# Patient Record
Sex: MALE | Race: WHITE | Employment: OTHER | URBAN - METROPOLITAN AREA
[De-identification: names, ages, dates, MRNs, and addresses within clinical notes are randomized per-mention and may not be internally consistent; named-entity substitution may affect disease eponyms.]

---

## 2018-03-06 ENCOUNTER — TELEPHONE (OUTPATIENT)
Dept: FAMILY MEDICINE CLINIC | Age: 68
End: 2018-03-06

## 2018-04-04 ENCOUNTER — OFFICE VISIT (OUTPATIENT)
Dept: FAMILY MEDICINE CLINIC | Age: 68
End: 2018-04-04
Payer: MEDICARE

## 2018-04-04 VITALS
HEIGHT: 73 IN | BODY MASS INDEX: 29.37 KG/M2 | DIASTOLIC BLOOD PRESSURE: 80 MMHG | HEART RATE: 96 BPM | WEIGHT: 221.6 LBS | RESPIRATION RATE: 16 BRPM | TEMPERATURE: 98 F | SYSTOLIC BLOOD PRESSURE: 182 MMHG

## 2018-04-04 DIAGNOSIS — R94.31 ABNORMAL EKG: ICD-10-CM

## 2018-04-04 DIAGNOSIS — I10 ESSENTIAL HYPERTENSION: ICD-10-CM

## 2018-04-04 DIAGNOSIS — I49.3 FREQUENT PVCS: ICD-10-CM

## 2018-04-04 DIAGNOSIS — E78.5 DYSLIPIDEMIA: ICD-10-CM

## 2018-04-04 DIAGNOSIS — E03.9 HYPOTHYROIDISM, UNSPECIFIED TYPE: Primary | ICD-10-CM

## 2018-04-04 DIAGNOSIS — I49.9 IRREGULAR HEART BEAT: ICD-10-CM

## 2018-04-04 PROCEDURE — 93000 ELECTROCARDIOGRAM COMPLETE: CPT | Performed by: NURSE PRACTITIONER

## 2018-04-04 PROCEDURE — 99204 OFFICE O/P NEW MOD 45 MIN: CPT | Performed by: NURSE PRACTITIONER

## 2018-04-04 RX ORDER — LEVOTHYROXINE SODIUM 175 UG/1
175 TABLET ORAL DAILY
Qty: 90 TABLET | Refills: 3 | Status: SHIPPED | OUTPATIENT
Start: 2018-04-04 | End: 2018-04-05 | Stop reason: ALTCHOICE

## 2018-04-04 RX ORDER — LISINOPRIL 20 MG/1
20 TABLET ORAL DAILY
COMMUNITY
End: 2018-04-04 | Stop reason: ALTCHOICE

## 2018-04-04 RX ORDER — LEVOTHYROXINE SODIUM 175 UG/1
175 TABLET ORAL DAILY
COMMUNITY
End: 2018-04-04 | Stop reason: SDUPTHER

## 2018-04-04 RX ORDER — METOPROLOL SUCCINATE 25 MG/1
25 TABLET, EXTENDED RELEASE ORAL DAILY
Qty: 30 TABLET | Refills: 3 | Status: SHIPPED | OUTPATIENT
Start: 2018-04-04 | End: 2018-07-20 | Stop reason: SDUPTHER

## 2018-04-04 RX ORDER — ATORVASTATIN CALCIUM 40 MG/1
40 TABLET, FILM COATED ORAL DAILY
Qty: 90 TABLET | Refills: 3 | Status: SHIPPED | OUTPATIENT
Start: 2018-04-04 | End: 2019-04-23 | Stop reason: SDUPTHER

## 2018-04-04 RX ORDER — ATORVASTATIN CALCIUM 40 MG/1
40 TABLET, FILM COATED ORAL DAILY
COMMUNITY
End: 2018-04-04 | Stop reason: SDUPTHER

## 2018-04-04 RX ORDER — LISINOPRIL AND HYDROCHLOROTHIAZIDE 25; 20 MG/1; MG/1
2 TABLET ORAL DAILY
Qty: 180 TABLET | Refills: 3 | Status: SHIPPED | OUTPATIENT
Start: 2018-04-04 | End: 2018-04-25 | Stop reason: ALTCHOICE

## 2018-04-04 RX ORDER — POTASSIUM CHLORIDE 20 MEQ/1
20 TABLET, EXTENDED RELEASE ORAL DAILY
Qty: 90 TABLET | Refills: 3 | Status: SHIPPED | OUTPATIENT
Start: 2018-04-04 | End: 2018-04-05 | Stop reason: SDUPTHER

## 2018-04-04 RX ORDER — POTASSIUM CHLORIDE 1.5 G/1.77G
20 POWDER, FOR SOLUTION ORAL DAILY
COMMUNITY
End: 2018-04-04 | Stop reason: ALTCHOICE

## 2018-04-04 RX ORDER — LISINOPRIL AND HYDROCHLOROTHIAZIDE 20; 12.5 MG/1; MG/1
1 TABLET ORAL DAILY
COMMUNITY
End: 2018-04-19 | Stop reason: ALTCHOICE

## 2018-04-04 ASSESSMENT — PATIENT HEALTH QUESTIONNAIRE - PHQ9
2. FEELING DOWN, DEPRESSED OR HOPELESS: 0
SUM OF ALL RESPONSES TO PHQ9 QUESTIONS 1 & 2: 0
SUM OF ALL RESPONSES TO PHQ QUESTIONS 1-9: 0
1. LITTLE INTEREST OR PLEASURE IN DOING THINGS: 0

## 2018-04-05 ENCOUNTER — TELEPHONE (OUTPATIENT)
Dept: FAMILY MEDICINE CLINIC | Age: 68
End: 2018-04-05

## 2018-04-05 DIAGNOSIS — I10 ESSENTIAL HYPERTENSION: Primary | ICD-10-CM

## 2018-04-05 DIAGNOSIS — E03.9 ACQUIRED HYPOTHYROIDISM: ICD-10-CM

## 2018-04-05 PROBLEM — E78.5 DYSLIPIDEMIA: Status: ACTIVE | Noted: 2018-04-05

## 2018-04-05 LAB
A/G RATIO: 1.4 (ref 1.5–2.5)
ABSOLUTE BASO #: 0 /CMM (ref 0–200)
ABSOLUTE EOS #: 100 /CMM (ref 0–500)
ABSOLUTE LYMPH #: 1400 /CMM (ref 1000–4800)
ABSOLUTE MONO #: 600 /CMM (ref 0–800)
ABSOLUTE NEUT #: 3400 /CMM (ref 1800–7700)
ALBUMIN SERPL-MCNC: 4.3 GM/DL (ref 3.5–5)
ALP BLD-CCNC: 81 IU/L (ref 41–137)
ALT SERPL-CCNC: 25 IU/L (ref 10–40)
ANION GAP SERPL CALCULATED.3IONS-SCNC: 7 MMOL/L (ref 4–12)
AST SERPL-CCNC: 24 IU/L (ref 15–41)
BASOPHILS RELATIVE PERCENT: 0.7 % (ref 0–2)
BILIRUB SERPL-MCNC: 1.1 MG/DL (ref 0.2–1)
BUN BLDV-MCNC: 18 MG/DL (ref 7–20)
CALCIUM SERPL-MCNC: 9.5 MG/DL (ref 8.8–10.5)
CHLORIDE BLD-SCNC: 101 MEQ/L (ref 101–111)
CHOLESTEROL/HDL RELATIVE RISK: 2.9 (ref 4–5)
CHOLESTEROL: 138 MG/DL
CO2: 33 MEQ/L (ref 21–32)
CREAT SERPL-MCNC: 1.08 MG/DL (ref 0.7–1.3)
CREATININE CLEARANCE: >60
DIRECT-LDL / HDL RISK: 2
EOSINOPHILS RELATIVE PERCENT: 2.5 % (ref 0–6)
GLUCOSE: 102 MG/DL (ref 70–110)
HCT VFR BLD CALC: 45 % (ref 40–49)
HDLC SERPL-MCNC: 47 MG/DL
HEMOGLOBIN: 15.3 GM/DL (ref 13.5–16.5)
LDL CHOLESTEROL DIRECT: 97 MG/DL
LYMPHOCYTES RELATIVE PERCENT: 25.7 % (ref 15–45)
MCH RBC QN AUTO: 30.5 PG (ref 27.5–33)
MCHC RBC AUTO-ENTMCNC: 34 GM/DL (ref 33–36)
MCV RBC AUTO: 89.7 CU MIC (ref 80–97)
MONOCYTES RELATIVE PERCENT: 10.2 % (ref 2–10)
NEUTROPHILS RELATIVE PERCENT: 60.9 % (ref 40–70)
NUCLEATED RBCS: 0 /100 WBC
PDW BLD-RTO: 13.3 % (ref 12–16)
PLATELET # BLD: 202 TH/CMM (ref 150–400)
POTASSIUM SERPL-SCNC: 3.3 MEQ/L (ref 3.6–5)
RBC # BLD: 5.02 MIL/CMM (ref 4.5–6)
SODIUM BLD-SCNC: 141 MEQ/L (ref 135–145)
T4 FREE: 1.15 NG/DL (ref 0.61–1.12)
TOTAL PROTEIN: 7.3 G/DL (ref 6.2–8)
TRIGL SERPL-MCNC: 91 MG/DL
TSH REFLEX: < 0.03 MCIU/ML (ref 0.49–4.67)
VLDLC SERPL CALC-MCNC: 18 MG/DL
WBC # BLD: 5.6 TH/CMM (ref 4.4–10.5)

## 2018-04-05 RX ORDER — LEVOTHYROXINE SODIUM 0.15 MG/1
150 TABLET ORAL DAILY
Qty: 30 TABLET | Refills: 3 | Status: SHIPPED | OUTPATIENT
Start: 2018-04-05 | End: 2018-06-12 | Stop reason: DRUGHIGH

## 2018-04-05 RX ORDER — POTASSIUM CHLORIDE 20 MEQ/1
20 TABLET, EXTENDED RELEASE ORAL 2 TIMES DAILY
Qty: 180 TABLET | Refills: 3 | Status: SHIPPED | OUTPATIENT
Start: 2018-04-05 | End: 2019-08-05 | Stop reason: SDUPTHER

## 2018-04-11 ENCOUNTER — OFFICE VISIT (OUTPATIENT)
Dept: FAMILY MEDICINE CLINIC | Age: 68
End: 2018-04-11
Payer: MEDICARE

## 2018-04-11 VITALS
WEIGHT: 220.8 LBS | TEMPERATURE: 97.9 F | HEIGHT: 73 IN | SYSTOLIC BLOOD PRESSURE: 184 MMHG | RESPIRATION RATE: 14 BRPM | HEART RATE: 60 BPM | DIASTOLIC BLOOD PRESSURE: 102 MMHG | BODY MASS INDEX: 29.26 KG/M2

## 2018-04-11 DIAGNOSIS — I10 ESSENTIAL HYPERTENSION: Primary | ICD-10-CM

## 2018-04-11 DIAGNOSIS — Z23 NEED FOR PNEUMOCOCCAL VACCINATION: ICD-10-CM

## 2018-04-11 PROCEDURE — G0009 ADMIN PNEUMOCOCCAL VACCINE: HCPCS | Performed by: NURSE PRACTITIONER

## 2018-04-11 PROCEDURE — 90670 PCV13 VACCINE IM: CPT | Performed by: NURSE PRACTITIONER

## 2018-04-11 PROCEDURE — 99213 OFFICE O/P EST LOW 20 MIN: CPT | Performed by: NURSE PRACTITIONER

## 2018-04-11 RX ORDER — NIFEDIPINE 30 MG/1
30 TABLET, EXTENDED RELEASE ORAL DAILY
Qty: 30 TABLET | Refills: 3 | Status: SHIPPED | OUTPATIENT
Start: 2018-04-11 | End: 2018-08-15 | Stop reason: SDUPTHER

## 2018-04-19 ENCOUNTER — TELEPHONE (OUTPATIENT)
Dept: FAMILY MEDICINE CLINIC | Age: 68
End: 2018-04-19

## 2018-04-19 DIAGNOSIS — E87.6 HYPOKALEMIA: ICD-10-CM

## 2018-04-19 DIAGNOSIS — I10 ESSENTIAL HYPERTENSION: Primary | ICD-10-CM

## 2018-04-19 LAB
ANION GAP SERPL CALCULATED.3IONS-SCNC: 7 MMOL/L (ref 4–12)
BUN BLDV-MCNC: 25 MG/DL (ref 7–20)
CALCIUM SERPL-MCNC: 9.5 MG/DL (ref 8.8–10.5)
CHLORIDE BLD-SCNC: 101 MEQ/L (ref 101–111)
CO2: 33 MEQ/L (ref 21–32)
CREAT SERPL-MCNC: 1.21 MG/DL (ref 0.7–1.3)
CREATININE CLEARANCE: 60
GLUCOSE: 111 MG/DL (ref 70–110)
POTASSIUM SERPL-SCNC: 3.3 MEQ/L (ref 3.6–5)
SODIUM BLD-SCNC: 141 MEQ/L (ref 135–145)

## 2018-04-19 RX ORDER — SPIRONOLACTONE 25 MG/1
25 TABLET ORAL DAILY
Qty: 30 TABLET | Refills: 3 | Status: SHIPPED | OUTPATIENT
Start: 2018-04-19 | End: 2018-04-25 | Stop reason: ALTCHOICE

## 2018-04-25 ENCOUNTER — OFFICE VISIT (OUTPATIENT)
Dept: FAMILY MEDICINE CLINIC | Age: 68
End: 2018-04-25
Payer: MEDICARE

## 2018-04-25 VITALS
RESPIRATION RATE: 12 BRPM | HEIGHT: 73 IN | DIASTOLIC BLOOD PRESSURE: 82 MMHG | BODY MASS INDEX: 29.74 KG/M2 | HEART RATE: 68 BPM | TEMPERATURE: 97.6 F | SYSTOLIC BLOOD PRESSURE: 138 MMHG | WEIGHT: 224.4 LBS

## 2018-04-25 DIAGNOSIS — I10 ESSENTIAL HYPERTENSION: Primary | ICD-10-CM

## 2018-04-25 PROCEDURE — 99213 OFFICE O/P EST LOW 20 MIN: CPT | Performed by: NURSE PRACTITIONER

## 2018-04-25 RX ORDER — SPIRONOLACTONE 25 MG/1
25 TABLET ORAL DAILY
COMMUNITY
End: 2018-08-29

## 2018-04-25 RX ORDER — HYDROCHLOROTHIAZIDE 12.5 MG/1
12.5 TABLET ORAL DAILY
Qty: 30 TABLET | Refills: 3 | Status: SHIPPED | OUTPATIENT
Start: 2018-04-25 | End: 2018-08-28 | Stop reason: SDUPTHER

## 2018-04-25 RX ORDER — LISINOPRIL 40 MG/1
40 TABLET ORAL DAILY
Qty: 30 TABLET | Refills: 3 | Status: SHIPPED | OUTPATIENT
Start: 2018-04-25 | End: 2018-08-15 | Stop reason: SDUPTHER

## 2018-05-10 ENCOUNTER — TELEPHONE (OUTPATIENT)
Dept: FAMILY MEDICINE CLINIC | Age: 68
End: 2018-05-10

## 2018-05-10 LAB
ANION GAP SERPL CALCULATED.3IONS-SCNC: 6 MMOL/L (ref 4–12)
BUN BLDV-MCNC: 23 MG/DL (ref 7–20)
CALCIUM SERPL-MCNC: 9.3 MG/DL (ref 8.8–10.5)
CHLORIDE BLD-SCNC: 103 MEQ/L (ref 101–111)
CO2: 32 MEQ/L (ref 21–32)
CREAT SERPL-MCNC: 1.16 MG/DL (ref 0.7–1.3)
CREATININE CLEARANCE: >60
GLUCOSE: 156 MG/DL (ref 70–110)
POTASSIUM SERPL-SCNC: 3.4 MEQ/L (ref 3.6–5)
SODIUM BLD-SCNC: 141 MEQ/L (ref 135–145)

## 2018-05-24 ENCOUNTER — OFFICE VISIT (OUTPATIENT)
Dept: FAMILY MEDICINE CLINIC | Age: 68
End: 2018-05-24
Payer: MEDICARE

## 2018-05-24 VITALS
HEART RATE: 53 BPM | SYSTOLIC BLOOD PRESSURE: 130 MMHG | DIASTOLIC BLOOD PRESSURE: 82 MMHG | HEIGHT: 72 IN | BODY MASS INDEX: 30.2 KG/M2 | RESPIRATION RATE: 14 BRPM | WEIGHT: 223 LBS | TEMPERATURE: 98 F

## 2018-05-24 DIAGNOSIS — E03.9 ACQUIRED HYPOTHYROIDISM: ICD-10-CM

## 2018-05-24 DIAGNOSIS — I10 ESSENTIAL HYPERTENSION: Primary | ICD-10-CM

## 2018-05-24 PROCEDURE — 99213 OFFICE O/P EST LOW 20 MIN: CPT | Performed by: NURSE PRACTITIONER

## 2018-06-12 ENCOUNTER — TELEPHONE (OUTPATIENT)
Dept: FAMILY MEDICINE CLINIC | Age: 68
End: 2018-06-12

## 2018-06-12 DIAGNOSIS — E03.9 ACQUIRED HYPOTHYROIDISM: Primary | ICD-10-CM

## 2018-06-12 LAB
POTASSIUM SERPL-SCNC: 3.7 MEQ/L (ref 3.6–5)
T4 FREE: 1.15 NG/DL (ref 0.61–1.12)
TSH REFLEX: < 0.03 MCIU/ML (ref 0.49–4.67)

## 2018-06-12 RX ORDER — LEVOTHYROXINE SODIUM 137 UG/1
137 TABLET ORAL DAILY
Qty: 30 TABLET | Refills: 3 | Status: SHIPPED | OUTPATIENT
Start: 2018-06-12 | End: 2018-07-31 | Stop reason: SDUPTHER

## 2018-07-13 ENCOUNTER — OFFICE VISIT (OUTPATIENT)
Dept: FAMILY MEDICINE CLINIC | Age: 68
End: 2018-07-13
Payer: MEDICARE

## 2018-07-13 VITALS
DIASTOLIC BLOOD PRESSURE: 78 MMHG | HEART RATE: 57 BPM | SYSTOLIC BLOOD PRESSURE: 138 MMHG | WEIGHT: 225 LBS | HEIGHT: 73 IN | TEMPERATURE: 97.6 F | RESPIRATION RATE: 14 BRPM | BODY MASS INDEX: 29.82 KG/M2

## 2018-07-13 DIAGNOSIS — M54.16 LUMBAR RADICULOPATHY: Primary | ICD-10-CM

## 2018-07-13 PROCEDURE — 99213 OFFICE O/P EST LOW 20 MIN: CPT | Performed by: NURSE PRACTITIONER

## 2018-07-13 PROCEDURE — 96372 THER/PROPH/DIAG INJ SC/IM: CPT | Performed by: NURSE PRACTITIONER

## 2018-07-13 RX ORDER — TIZANIDINE 4 MG/1
4 TABLET ORAL EVERY 6 HOURS PRN
Qty: 30 TABLET | Refills: 0 | Status: SHIPPED | OUTPATIENT
Start: 2018-07-13 | End: 2018-07-13 | Stop reason: SDUPTHER

## 2018-07-13 RX ORDER — PREDNISONE 20 MG/1
40 TABLET ORAL DAILY
Qty: 6 TABLET | Refills: 0 | Status: SHIPPED | OUTPATIENT
Start: 2018-07-13 | End: 2018-07-13 | Stop reason: SDUPTHER

## 2018-07-13 RX ORDER — TIZANIDINE 4 MG/1
4 TABLET ORAL EVERY 6 HOURS PRN
Qty: 30 TABLET | Refills: 0 | Status: SHIPPED | OUTPATIENT
Start: 2018-07-13 | End: 2018-08-29 | Stop reason: ALTCHOICE

## 2018-07-13 RX ORDER — KETOROLAC TROMETHAMINE 30 MG/ML
30 INJECTION, SOLUTION INTRAMUSCULAR; INTRAVENOUS ONCE
Status: COMPLETED | OUTPATIENT
Start: 2018-07-13 | End: 2018-07-13

## 2018-07-13 RX ORDER — PREDNISONE 20 MG/1
40 TABLET ORAL DAILY
Qty: 6 TABLET | Refills: 0 | Status: SHIPPED | OUTPATIENT
Start: 2018-07-13 | End: 2018-07-16

## 2018-07-13 RX ORDER — METHYLPREDNISOLONE ACETATE 40 MG/ML
40 INJECTION, SUSPENSION INTRA-ARTICULAR; INTRALESIONAL; INTRAMUSCULAR; SOFT TISSUE ONCE
Status: COMPLETED | OUTPATIENT
Start: 2018-07-13 | End: 2018-07-13

## 2018-07-13 RX ADMIN — METHYLPREDNISOLONE ACETATE 40 MG: 40 INJECTION, SUSPENSION INTRA-ARTICULAR; INTRALESIONAL; INTRAMUSCULAR; SOFT TISSUE at 11:16

## 2018-07-13 RX ADMIN — KETOROLAC TROMETHAMINE 30 MG: 30 INJECTION, SOLUTION INTRAMUSCULAR; INTRAVENOUS at 11:17

## 2018-07-13 NOTE — PATIENT INSTRUCTIONS
Patient Education        Herniated Disc: Exercises  Your Care Instructions  Here are some examples of typical rehabilitation exercises for your condition. Start each exercise slowly. Ease off the exercise if you start to have pain. Your doctor or physical therapist will tell you when you can start these exercises and which ones will work best for you. How to stay safe  These exercises can help you move easier and feel better. But when you first start doing them, you may have more pain in your back. This is normal. But it is important to pay close attention to your pain during and after each exercise. · Keep doing these exercises if your pain stays the same or moves from your leg and buttock more toward the middle of your spine. Pain moving out of your leg and buttock is a good sign. · Stop doing these exercises if your pain gets worse in your leg and buttock. Stop if you start to have pain in your leg and buttock that you didn't have before. Be sure to do these exercises in the order they appear. Note how your pain changes before you move to the next one. If your pain is much worse right after exercise and stays worse the next day, do not do any of these exercises. How to do the exercises  1. Rest on belly    1. Lie on your stomach, face down, with your head turned to the side. Place your arms beside your body. If this bothers your neck, place your hands, one on top of the other, underneath your forehead. This will help support your head and neck. 2. Try to relax your lower back muscles as much as you can. 3. Continue to lie on your stomach for 2 minutes. 4. If your pain spreads down your leg or increases down your leg, stop this exercise and do not do the next exercises. 2. Press-up    1. Lie on your stomach, face down. Keep your elbows tucked into your sides and under your shoulders. 2. Press your elbows down into the floor to raise your upper back. As you do this, relax your stomach muscles.  Allow please click on the \"Sign Up Now\" link. Current as of: November 29, 2017  Content Version: 11.6  © 8120-7676 [employer]. Care instructions adapted under license by [facility] ([facility]). If you have questions about a medical condition or this instruction, always ask your healthcare professional. [address] any warranty or liability for your use of this information.

## 2018-07-13 NOTE — PROGRESS NOTES
Take 2 tablets by mouth daily for 3 days  -     tiZANidine (ZANAFLEX) 4 MG tablet; Take 1 tablet by mouth every 6 hours as needed (back pain, muscle spasms)    Other orders  -     Discontinue: predniSONE (DELTASONE) 20 MG tablet; Take 2 tablets by mouth daily for 3 days  -     Discontinue: tiZANidine (ZANAFLEX) 4 MG tablet; Take 1 tablet by mouth every 6 hours as needed (back pain, muscle spasms)      - Benefits, risks and SE discussed  - home exercises  - if no improvement, call or RTC  Return if symptoms worsen or fail to improve. Donavan Soria received counseling on the following healthy behaviors: medication adherence  Reviewed prior labs and health maintenance. Continue current medications, diet and exercise. Discussed use, benefit, and side effects of prescribed medications. Barriers to medication compliance addressed. Patient given educational materials - see patient instructions. All patient questions answered. Patient voiced understanding.

## 2018-07-26 ENCOUNTER — TELEPHONE (OUTPATIENT)
Dept: FAMILY MEDICINE CLINIC | Age: 68
End: 2018-07-26
Payer: MEDICARE

## 2018-07-26 DIAGNOSIS — Z53.20 COLONOSCOPY REFUSED: Primary | ICD-10-CM

## 2018-07-26 LAB
CONTROL: NORMAL
HEMOCCULT STL QL: NEGATIVE

## 2018-07-26 PROCEDURE — 82274 ASSAY TEST FOR BLOOD FECAL: CPT | Performed by: NURSE PRACTITIONER

## 2018-07-26 NOTE — TELEPHONE ENCOUNTER
Patient returned FIT test to office. Test performed and results were NEGATIVE . Results recorded to pt's chart and HM updated. Encounter routed to [facility] for further review.

## 2018-07-27 ENCOUNTER — TELEPHONE (OUTPATIENT)
Dept: FAMILY MEDICINE CLINIC | Age: 68
End: 2018-07-27

## 2018-07-27 NOTE — TELEPHONE ENCOUNTER
----- Message from KATHRINE Singer CNP sent at 7/27/2018  7:24 AM EDT -----  Let Dillard Osgood know his FIT test was negative.

## 2018-07-31 ENCOUNTER — TELEPHONE (OUTPATIENT)
Dept: FAMILY MEDICINE CLINIC | Age: 68
End: 2018-07-31

## 2018-07-31 DIAGNOSIS — E03.9 ACQUIRED HYPOTHYROIDISM: ICD-10-CM

## 2018-07-31 LAB
T4 FREE: 1.15 NG/DL (ref 0.61–1.12)
TSH REFLEX: 0.17 MCIU/ML (ref 0.49–4.67)

## 2018-07-31 RX ORDER — LEVOTHYROXINE SODIUM 112 UG/1
112 TABLET ORAL DAILY
Qty: 30 TABLET | Refills: 5 | Status: SHIPPED | OUTPATIENT
Start: 2018-07-31 | End: 2018-08-01 | Stop reason: SDUPTHER

## 2018-08-01 RX ORDER — LEVOTHYROXINE SODIUM 112 UG/1
112 TABLET ORAL DAILY
Qty: 30 TABLET | Refills: 5 | Status: SHIPPED | OUTPATIENT
Start: 2018-08-01 | End: 2019-02-05 | Stop reason: SDUPTHER

## 2018-08-28 DIAGNOSIS — I10 ESSENTIAL HYPERTENSION: ICD-10-CM

## 2018-08-28 RX ORDER — HYDROCHLOROTHIAZIDE 12.5 MG/1
TABLET ORAL
Qty: 90 TABLET | Refills: 3 | Status: SHIPPED | OUTPATIENT
Start: 2018-08-28 | End: 2018-09-25 | Stop reason: ALTCHOICE

## 2018-08-29 ENCOUNTER — OFFICE VISIT (OUTPATIENT)
Dept: FAMILY MEDICINE CLINIC | Age: 68
End: 2018-08-29
Payer: MEDICARE

## 2018-08-29 VITALS
TEMPERATURE: 98.3 F | SYSTOLIC BLOOD PRESSURE: 128 MMHG | HEIGHT: 72 IN | WEIGHT: 221 LBS | RESPIRATION RATE: 14 BRPM | BODY MASS INDEX: 29.93 KG/M2 | DIASTOLIC BLOOD PRESSURE: 78 MMHG | HEART RATE: 74 BPM

## 2018-08-29 DIAGNOSIS — I10 ESSENTIAL HYPERTENSION: Primary | ICD-10-CM

## 2018-08-29 DIAGNOSIS — E03.9 ACQUIRED HYPOTHYROIDISM: ICD-10-CM

## 2018-08-29 PROCEDURE — 99214 OFFICE O/P EST MOD 30 MIN: CPT | Performed by: NURSE PRACTITIONER

## 2018-08-29 RX ORDER — ASPIRIN 81 MG/1
81 TABLET ORAL DAILY
Qty: 90 TABLET | Refills: 3 | Status: SHIPPED | OUTPATIENT
Start: 2018-08-29 | End: 2019-02-20 | Stop reason: ALTCHOICE

## 2018-08-29 NOTE — PROGRESS NOTES
Chief Complaint   Patient presents with    Follow-up     3 month f/u hypothyroidism, no new concerns        History obtained from chart review and the patient. SUBJECTIVE:  Josefina Jane is a 70 y.o. male that presents today for follow up HTN and Hypothyroidism    Hypothyroidism    HPI:  Currently treated for Hypothyroidism? Yes - dose was decreased to 112 mcg  Fatigue? No  Recent change in weight? No  Cold/Heat intolerance? No  Diarrhea/Constipation? No  Diaphoresis? No  Anxiety? No  Palpitations? No   Hair Loss? Yes    HTN    Does patient check BP regularly at home? -130/80  Current Medication regimen - Lisinopril 40 mg, HCTZ 12.5 mg Procardia 30 mg Toprol 25 mg  Tolerating medications well? - yes    Shortness of breath or chest pain? No  Headache or visual complaints? No  Neurologic changes like confusion? No  Extremity edema?  No    BP Readings from Last 3 Encounters:   08/29/18 128/78   07/13/18 138/78   05/24/18 130/82       Age/Gender Health Maintenance    Lipid - 4/18  DM Screen - 4/18  Colon Cancer Screening - 50, FIT test given  Lung Cancer Screening (Age 54 to [de-identified] with 30 pack year hx, current smoker or quit within past 15 years) - n/a    Tetanus - needs  Influenza Vaccine - 9/18  Pneumonia Vaccine - Prevnar 4/11/18  Zostavax - needs     PSA testing discussion - 49  AAA Screening - needs    Falls screening - n/a    Current Outpatient Prescriptions   Medication Sig Dispense Refill    aspirin EC 81 MG EC tablet Take 1 tablet by mouth daily 90 tablet 3    hydrochlorothiazide (HYDRODIURIL) 12.5 MG tablet TAKE 1 TABLET BY MOUTH ONCE DAILY 90 tablet 3    lisinopril (PRINIVIL;ZESTRIL) 40 MG tablet TAKE 1 TABLET BY MOUTH ONCE DAILY 90 tablet 3    spironolactone (ALDACTONE) 25 MG tablet TAKE 1 TABLET BY MOUTH ONCE DAILY 90 tablet 3    NIFEdipine (PROCARDIA XL) 30 MG extended release tablet TAKE 1 TABLET BY MOUTH ONCE DAILY 90 tablet 3    levothyroxine (SYNTHROID) 112 MCG tablet Take 1 tablet by mouth Daily 30 tablet 5    metoprolol succinate (TOPROL XL) 25 MG extended release tablet TAKE 1 TABLET BY MOUTH ONCE DAILY 90 tablet 3    potassium chloride (KLOR-CON M) 20 MEQ extended release tablet Take 1 tablet by mouth 2 times daily (Patient taking differently: Take 20 mEq by mouth daily ) 180 tablet 3    atorvastatin (LIPITOR) 40 MG tablet Take 1 tablet by mouth daily 90 tablet 3     No current facility-administered medications for this visit. Orders Placed This Encounter   Medications    aspirin EC 81 MG EC tablet     Sig: Take 1 tablet by mouth daily     Dispense:  90 tablet     Refill:  3         All medications reviewed and reconciled, including OTC and herbal medications. Updated list given to patient. Patient Active Problem List   Diagnosis    Essential hypertension    Dyslipidemia    Acquired hypothyroidism       Past Medical History:   Diagnosis Date    Dyslipidemia     Essential hypertension     Head trauma     Hypothyroidism     Stroke ([facility] [address] 1981       Past Surgical History:   Procedure Laterality Date    ANKLE SURGERY Right 07/2014       Allergies   Allergen Reactions    Penicillins Swelling    Sulfa Antibiotics Swelling       Social History     Social History    Marital status:      Spouse name: N/A    Number of children: N/A    Years of education: N/A     Occupational History    Not on file. Social History Main Topics    Smoking status: Never Smoker    Smokeless tobacco: Never Used    Alcohol use No    Drug use: No    Sexual activity: Not on file     Other Topics Concern    Not on file     Social History Narrative    No narrative on file       Family History   Problem Relation Age of Onset    Cancer Mother     Other Father 76        parkinsons         I have reviewed the patient's past medical history, past surgical history, allergies, medications, social and family history and I have made updates where appropriate.       Review of EXAM:  Vitals:    08/29/18 1429   BP: 128/78   Pulse: 74   Resp: 14   Temp: 98.3 °F (36.8 °C)   TempSrc: Oral   Weight: 221 lb (100.2 kg)   Height: 6' (1.829 m)     Body mass index is 29.97 kg/m². VS Reviewed  General Appearance: A&O x 3, No acute distress,well developed and well- nourished  Head: normocephalic and atraumatic  Eyes: pupils equal, round, and reactive to light, extraocular eye movements intact, conjunctivae and eye lids without erythema  Neck: supple and non-tender without mass, no thyromegaly or thyroid nodules, no cervical lymphadenopathy  Pulmonary/Chest: clear to auscultation bilaterally- no wheezes, rales or rhonchi, normal air movement, no respiratory distress or retractions  Cardiovascular: S1 and S2 auscultated with a few ectopic beats. No murmurs, rubs, clicks, or gallops, distal pulses intact. Abdomen: soft, non-tender, non-distended, bowl sounds physiologic,  no rebound or guarding, no masses or hernias noted. Liver and spleen without enlargement. Extremities: no cyanosis, clubbing or edema of the lower extremities. +2 PT/DP bilaterally. Musculoskeletal: No joint swelling or gross deformity   Neuro:  Alert, 5/5 strength globally and symmetrically  Psych: Affect appropriate. Mood normal. Thought process is normal without evidence of depression or psychosis. Good insight and appropriate interaction. Cognition and memory appear to be intact. Skin: warm and dry, no rash or erythema  Lymph:  No cervical, auricular or supraclavicular lymph nodes palpated        ASSESSMENT & PLAN  Mavis Cowden was seen today for follow-up. Diagnoses and all orders for this visit:    Essential hypertension  -     aspirin EC 81 MG EC tablet;  Take 1 tablet by mouth daily    Acquired hypothyroidism    - con't Metoprolol, Procardia, hctz, Spironolactone, Lisinopril  - dose of Synthroid recently adjusted and decreased to 112 mcg, labs placed to recheck TSH in 4 weeks  - add Baby ASA for cardiac protection (Hx of hemorrhagic stroke in 1981 due to trauma, but no other Hx of bleeding issues)      DISPOSITION    Return in about 6 months (around 2/28/2019) for chronic conditions. Nate Woods released without restrictions. PATIENT COUNSELING    Counseling was provided today regarding the following topics: Healthy eating habits, Regular exercise, substance abuse and healthy sleep habits. Nate Woods received counseling on the following healthy behaviors: medication adherence    Patient given educational materials on: See Attached    I have instructed Nate Woods to complete a self tracking handout on Blood Pressures  and instructed them to bring it with them to his next appointment. Barriers to learning and self management: none    Discussed use, benefit, and side effects of prescribed medications. Barriers to medication compliance addressed. All patient questions answered. Pt voiced understanding.        Electronically signed by KATHRINE Menard CNP on 8/29/2018 at 2:39 PM

## 2018-08-29 NOTE — PROGRESS NOTES
Visit Information    Have you changed or started any medications since your last visit including any over-the-counter medicines, vitamins, or herbal medicines? no   Are you having any side effects from any of your medications? -  no  Have you stopped taking any of your medications? Is so, why? -  no    Have you seen any other physician or provider since your last visit? No  Have you had any other diagnostic tests since your last visit? No  Have you been seen in the emergency room and/or had an admission to a hospital since we last saw you? No  Have you had your routine dental cleaning in the past 6 months? no    Have you activated your [employer] account? If not, what are your barriers?  No: pt declined      Patient Care Team:  KATHRINE Thayer CNP as PCP - General (Family Medicine)    Medical History Review  Past Medical, Family, and Social History reviewed and does contribute to the patient presenting condition    Health Maintenance   Topic Date Due    Hepatitis C screen  1950    DTaP/Tdap/Td vaccine (1 - Tdap) 08/28/1968    Shingles Vaccine (1 of 2 - 2 Dose Series) 08/28/1999    Flu vaccine (1) 09/01/2018    Pneumococcal low/med risk (2 of 2 - PPSV23) 04/11/2019    Creatinine monitoring  05/10/2019    Potassium monitoring  06/12/2019    Colon Cancer Screen FIT/FOBT  07/26/2019    TSH testing  07/31/2019    Diabetes screen  04/05/2021    Lipid screen  04/05/2023

## 2018-09-25 ENCOUNTER — TELEPHONE (OUTPATIENT)
Dept: FAMILY MEDICINE CLINIC | Age: 68
End: 2018-09-25

## 2018-09-25 DIAGNOSIS — I10 ESSENTIAL HYPERTENSION: Primary | ICD-10-CM

## 2018-09-25 RX ORDER — CHLORTHALIDONE 25 MG/1
25 TABLET ORAL DAILY
Qty: 90 TABLET | Refills: 3 | Status: SHIPPED | OUTPATIENT
Start: 2018-09-25 | End: 2018-09-25 | Stop reason: SDUPTHER

## 2018-09-25 RX ORDER — CHLORTHALIDONE 25 MG/1
25 TABLET ORAL DAILY
Qty: 14 TABLET | Refills: 0 | Status: SHIPPED | OUTPATIENT
Start: 2018-09-25 | End: 2019-02-20 | Stop reason: ALTCHOICE

## 2018-10-01 LAB — TSH REFLEX: 0.73 MCIU/ML (ref 0.49–4.67)

## 2018-10-02 ENCOUNTER — TELEPHONE (OUTPATIENT)
Dept: FAMILY MEDICINE CLINIC | Age: 68
End: 2018-10-02

## 2018-10-02 DIAGNOSIS — E03.9 ACQUIRED HYPOTHYROIDISM: Primary | ICD-10-CM

## 2019-01-10 ENCOUNTER — TELEPHONE (OUTPATIENT)
Dept: FAMILY MEDICINE CLINIC | Age: 69
End: 2019-01-10

## 2019-01-23 ENCOUNTER — TELEPHONE (OUTPATIENT)
Dept: FAMILY MEDICINE CLINIC | Age: 69
End: 2019-01-23

## 2019-01-23 LAB — TSH REFLEX: 3.13 MCIU/ML (ref 0.49–4.67)

## 2019-02-05 DIAGNOSIS — E03.9 ACQUIRED HYPOTHYROIDISM: ICD-10-CM

## 2019-02-05 RX ORDER — LEVOTHYROXINE SODIUM 112 UG/1
112 TABLET ORAL DAILY
Qty: 90 TABLET | Refills: 3 | Status: SHIPPED | OUTPATIENT
Start: 2019-02-05 | End: 2019-09-09

## 2019-02-20 ENCOUNTER — OFFICE VISIT (OUTPATIENT)
Dept: FAMILY MEDICINE CLINIC | Age: 69
End: 2019-02-20
Payer: MEDICARE

## 2019-02-20 ENCOUNTER — TELEPHONE (OUTPATIENT)
Dept: FAMILY MEDICINE CLINIC | Age: 69
End: 2019-02-20

## 2019-02-20 VITALS
HEIGHT: 72 IN | TEMPERATURE: 98.1 F | RESPIRATION RATE: 14 BRPM | HEART RATE: 56 BPM | DIASTOLIC BLOOD PRESSURE: 82 MMHG | BODY MASS INDEX: 31.56 KG/M2 | SYSTOLIC BLOOD PRESSURE: 132 MMHG | WEIGHT: 233 LBS

## 2019-02-20 DIAGNOSIS — E78.5 DYSLIPIDEMIA: ICD-10-CM

## 2019-02-20 DIAGNOSIS — E03.9 ACQUIRED HYPOTHYROIDISM: ICD-10-CM

## 2019-02-20 DIAGNOSIS — I10 ESSENTIAL HYPERTENSION: Primary | ICD-10-CM

## 2019-02-20 DIAGNOSIS — R73.01 IMPAIRED FASTING BLOOD SUGAR: ICD-10-CM

## 2019-02-20 PROCEDURE — 99214 OFFICE O/P EST MOD 30 MIN: CPT | Performed by: NURSE PRACTITIONER

## 2019-02-20 RX ORDER — HYDROCHLOROTHIAZIDE 12.5 MG/1
12.5 CAPSULE, GELATIN COATED ORAL DAILY
COMMUNITY
End: 2019-08-29 | Stop reason: SDUPTHER

## 2019-02-20 ASSESSMENT — PATIENT HEALTH QUESTIONNAIRE - PHQ9
SUM OF ALL RESPONSES TO PHQ9 QUESTIONS 1 & 2: 0
2. FEELING DOWN, DEPRESSED OR HOPELESS: 0
SUM OF ALL RESPONSES TO PHQ QUESTIONS 1-9: 0
1. LITTLE INTEREST OR PLEASURE IN DOING THINGS: 0
SUM OF ALL RESPONSES TO PHQ QUESTIONS 1-9: 0

## 2019-03-12 ENCOUNTER — TELEPHONE (OUTPATIENT)
Dept: FAMILY MEDICINE CLINIC | Age: 69
End: 2019-03-12

## 2019-03-12 PROBLEM — R73.03 PREDIABETES: Status: ACTIVE | Noted: 2019-03-12

## 2019-03-12 LAB
A/G RATIO: 1.2 (ref 1.5–2.5)
ABSOLUTE BASO #: 0 /CMM (ref 0–200)
ABSOLUTE EOS #: 200 /CMM (ref 0–500)
ABSOLUTE LYMPH #: 1400 /CMM (ref 1000–4800)
ABSOLUTE MONO #: 500 /CMM (ref 0–800)
ABSOLUTE NEUT #: 3800 /CMM (ref 1800–7700)
ALBUMIN SERPL-MCNC: 4.2 GM/DL (ref 3.5–5)
ALP BLD-CCNC: 63 IU/L (ref 41–137)
ALT SERPL-CCNC: 22 IU/L (ref 10–40)
ANION GAP SERPL CALCULATED.3IONS-SCNC: 11 MMOL/L (ref 4–12)
AST SERPL-CCNC: 25 IU/L (ref 15–41)
BASOPHILS RELATIVE PERCENT: 0.8 % (ref 0–2)
BILIRUB SERPL-MCNC: 1.1 MG/DL (ref 0.2–1)
BUN BLDV-MCNC: 19 MG/DL (ref 7–20)
CALCIUM SERPL-MCNC: 9.3 MG/DL (ref 8.8–10.5)
CHLORIDE BLD-SCNC: 100 MEQ/L (ref 101–111)
CHOLESTEROL/HDL RELATIVE RISK: 3.8 (ref 4–5)
CHOLESTEROL: 153 MG/DL
CO2: 29 MEQ/L (ref 21–32)
CREAT SERPL-MCNC: 1.14 MG/DL (ref 0.7–1.3)
CREATININE CLEARANCE: >60
DIRECT-LDL / HDL RISK: 2.4
EOSINOPHILS RELATIVE PERCENT: 3.2 % (ref 0–6)
ESTIMATED AVERAGE GLUCOSE: 128 MG/DL
GLUCOSE: 107 MG/DL (ref 70–110)
HBA1C MFR BLD: 6.1 % (ref 4.4–6.4)
HCT VFR BLD CALC: 45.3 % (ref 40–49)
HDLC SERPL-MCNC: 40 MG/DL
HEMOGLOBIN: 15 GM/DL (ref 13.5–16.5)
LDL CHOLESTEROL DIRECT: 96 MG/DL
LYMPHOCYTES RELATIVE PERCENT: 24.1 % (ref 15–45)
MCH RBC QN AUTO: 30.8 PG (ref 27.5–33)
MCHC RBC AUTO-ENTMCNC: 33.2 GM/DL (ref 33–36)
MCV RBC AUTO: 93 CU MIC (ref 80–97)
MONOCYTES RELATIVE PERCENT: 9 % (ref 2–10)
NEUTROPHILS RELATIVE PERCENT: 62.9 % (ref 40–70)
NUCLEATED RBCS: 0.1 /100 WBC
PDW BLD-RTO: 12.8 % (ref 12–16)
PLATELET # BLD: 202 TH/CMM (ref 150–400)
POTASSIUM SERPL-SCNC: 3.6 MEQ/L (ref 3.6–5)
RBC # BLD: 4.87 MIL/CMM (ref 4.5–6)
SODIUM BLD-SCNC: 140 MEQ/L (ref 135–145)
TOTAL PROTEIN: 7.8 G/DL (ref 6.2–8)
TRIGL SERPL-MCNC: 124 MG/DL
VLDLC SERPL CALC-MCNC: 24 MG/DL
WBC # BLD: 6 TH/CMM (ref 4.4–10.5)

## 2019-04-23 DIAGNOSIS — E78.5 DYSLIPIDEMIA: ICD-10-CM

## 2019-04-23 RX ORDER — ATORVASTATIN CALCIUM 40 MG/1
40 TABLET, FILM COATED ORAL DAILY
Qty: 90 TABLET | Refills: 3 | Status: SHIPPED | OUTPATIENT
Start: 2019-04-23 | End: 2020-05-18

## 2019-08-05 DIAGNOSIS — I10 ESSENTIAL HYPERTENSION: ICD-10-CM

## 2019-08-05 RX ORDER — POTASSIUM CHLORIDE 20 MEQ/1
20 TABLET, EXTENDED RELEASE ORAL DAILY
Qty: 90 TABLET | Refills: 3 | Status: SHIPPED | OUTPATIENT
Start: 2019-08-05 | End: 2020-08-25

## 2019-08-13 DIAGNOSIS — I49.3 FREQUENT PVCS: ICD-10-CM

## 2019-08-13 DIAGNOSIS — I49.9 IRREGULAR HEART BEAT: ICD-10-CM

## 2019-08-13 DIAGNOSIS — I10 ESSENTIAL HYPERTENSION: ICD-10-CM

## 2019-08-13 RX ORDER — METOPROLOL SUCCINATE 25 MG/1
25 TABLET, EXTENDED RELEASE ORAL DAILY
Qty: 90 TABLET | Refills: 3 | OUTPATIENT
Start: 2019-08-13

## 2019-08-13 RX ORDER — METOPROLOL SUCCINATE 25 MG/1
TABLET, EXTENDED RELEASE ORAL
Qty: 90 TABLET | Refills: 3 | Status: SHIPPED | OUTPATIENT
Start: 2019-08-13 | End: 2020-08-25

## 2019-08-29 DIAGNOSIS — I10 ESSENTIAL HYPERTENSION: ICD-10-CM

## 2019-08-29 DIAGNOSIS — E87.6 HYPOKALEMIA: ICD-10-CM

## 2019-08-29 RX ORDER — HYDROCHLOROTHIAZIDE 12.5 MG/1
12.5 CAPSULE, GELATIN COATED ORAL DAILY
Qty: 90 CAPSULE | Refills: 3 | Status: SHIPPED | OUTPATIENT
Start: 2019-08-29 | End: 2020-09-15

## 2019-08-29 RX ORDER — NIFEDIPINE 30 MG/1
TABLET, EXTENDED RELEASE ORAL
Qty: 90 TABLET | Refills: 3 | Status: SHIPPED | OUTPATIENT
Start: 2019-08-29 | End: 2020-09-15

## 2019-08-29 RX ORDER — LISINOPRIL 40 MG/1
TABLET ORAL
Qty: 90 TABLET | Refills: 3 | Status: SHIPPED | OUTPATIENT
Start: 2019-08-29 | End: 2020-09-15

## 2019-08-29 RX ORDER — SPIRONOLACTONE 25 MG/1
TABLET ORAL
Qty: 90 TABLET | Refills: 3 | Status: SHIPPED | OUTPATIENT
Start: 2019-08-29 | End: 2020-09-15

## 2019-09-09 ENCOUNTER — TELEPHONE (OUTPATIENT)
Dept: FAMILY MEDICINE CLINIC | Age: 69
End: 2019-09-09

## 2019-09-09 DIAGNOSIS — E03.9 ACQUIRED HYPOTHYROIDISM: ICD-10-CM

## 2019-09-09 LAB
T4 FREE: 0.76 NG/DL (ref 0.61–1.12)
TSH REFLEX: 7.13 MCIU/ML (ref 0.49–4.67)

## 2019-09-09 RX ORDER — LEVOTHYROXINE SODIUM 0.12 MG/1
125 TABLET ORAL DAILY
Qty: 90 TABLET | Refills: 0 | Status: SHIPPED | OUTPATIENT
Start: 2019-09-09 | End: 2019-12-19 | Stop reason: SDUPTHER

## 2019-09-09 NOTE — TELEPHONE ENCOUNTER
----- Message from KATHRINE Jason CNP sent at 9/9/2019 12:57 PM EDT -----  Let Po Richar know his thyroid hormone is a little underactive. His TSH was 7.1, previously was 3.1. Has he missed any doses?

## 2019-12-17 ENCOUNTER — TELEPHONE (OUTPATIENT)
Dept: FAMILY MEDICINE CLINIC | Age: 69
End: 2019-12-17

## 2019-12-19 DIAGNOSIS — E03.9 ACQUIRED HYPOTHYROIDISM: ICD-10-CM

## 2019-12-19 RX ORDER — LEVOTHYROXINE SODIUM 0.12 MG/1
125 TABLET ORAL DAILY
Qty: 90 TABLET | Refills: 0 | Status: SHIPPED | OUTPATIENT
Start: 2019-12-19 | End: 2020-03-09

## 2020-01-08 ENCOUNTER — TELEPHONE (OUTPATIENT)
Dept: FAMILY MEDICINE CLINIC | Age: 70
End: 2020-01-08

## 2020-02-20 ENCOUNTER — OFFICE VISIT (OUTPATIENT)
Dept: FAMILY MEDICINE CLINIC | Age: 70
End: 2020-02-20
Payer: MEDICARE

## 2020-02-20 VITALS
HEIGHT: 73 IN | DIASTOLIC BLOOD PRESSURE: 66 MMHG | TEMPERATURE: 97.4 F | SYSTOLIC BLOOD PRESSURE: 122 MMHG | HEART RATE: 80 BPM | WEIGHT: 238.4 LBS | BODY MASS INDEX: 31.6 KG/M2 | RESPIRATION RATE: 14 BRPM

## 2020-02-20 PROCEDURE — 90732 PPSV23 VACC 2 YRS+ SUBQ/IM: CPT | Performed by: NURSE PRACTITIONER

## 2020-02-20 PROCEDURE — 99213 OFFICE O/P EST LOW 20 MIN: CPT | Performed by: NURSE PRACTITIONER

## 2020-02-20 PROCEDURE — G0009 ADMIN PNEUMOCOCCAL VACCINE: HCPCS | Performed by: NURSE PRACTITIONER

## 2020-02-20 ASSESSMENT — PATIENT HEALTH QUESTIONNAIRE - PHQ9
SUM OF ALL RESPONSES TO PHQ9 QUESTIONS 1 & 2: 0
SUM OF ALL RESPONSES TO PHQ QUESTIONS 1-9: 0
1. LITTLE INTEREST OR PLEASURE IN DOING THINGS: 0
SUM OF ALL RESPONSES TO PHQ QUESTIONS 1-9: 0
2. FEELING DOWN, DEPRESSED OR HOPELESS: 0

## 2020-02-20 NOTE — PROGRESS NOTES
parkinsons         I have reviewed the patient's past medical history, past surgical history, allergies, medications, social and family history and I have made updates where appropriate.       Review of Systems  Positive responses are highlighted in bold    Constitutional:  Fever, Chills, Night Sweats, Fatigue, Unexpected changes in weight  Eyes:  Eye discharge, Eye pain, Eye redness, Visual disturbances   HENT:  Ear pain, Tinnitus, Nosebleeds, Trouble swallowing, Hearing loss, Sore throat  Cardiovascular:  Chest Pain, Palpitations, Orthopnea, Paroxysmal Nocturnal Dyspnea  Respiratory:  Cough, Wheezing, Shortness of breath, Chest tightness, Apnea  Gastrointestinal:  Nausea, Vomiting, Diarrhea, Constipation, Heartburn, Blood in stool  Genitourinary:  Difficulty or painful urination, Flank pain, Change in frequency, Urgency  Skin:  Color change, Rash, Itching, Wound  Psychiatric:  Hallucinations, Anxiety, Depression, Suicidal ideation  Hematological:  Enlarged glands, Easy bleeding, Easily bruising  Musculoskeletal:  Joint pain, Back pain, Gait problems, Joint swelling, Myalgias  Neurological:  Dizziness, Headaches, Presyncope, Numbness, Seizures, Tremors  Allergy:  Environmental allergies, Food allergies  Endocrine:  Heat Intolerance, Cold Intolerance, Polydipsia, Polyphagia, Polyuria    Lab Results   Component Value Date     03/12/2019    K 3.6 03/12/2019     (L) 03/12/2019    CO2 29 03/12/2019    BUN 19 03/12/2019    CREATININE 1.14 03/12/2019    GLUCOSE 107 03/12/2019    CALCIUM 9.3 03/12/2019    PROT 7.8 03/12/2019    LABALBU 4.2 03/12/2019    BILITOT 1.1 (H) 03/12/2019    ALKPHOS 63 03/12/2019    AST 25 03/12/2019    ALT 22 03/12/2019    AGRATIO 1.2 (L) 03/12/2019       Lab Results   Component Value Date    WBC 6.0 03/12/2019    HGB 15.0 03/12/2019    HCT 45.3 03/12/2019    MCV 93.0 03/12/2019     03/12/2019       PHYSICAL EXAM:  Vitals:    02/20/20 1501   BP: 122/66   Pulse: 80   Resp: 14 Temp: 97.4 °F (36.3 °C)   TempSrc: Oral   Weight: 238 lb 6.4 oz (108.1 kg)   Height: 6' 0.5\" (1.842 m)     Body mass index is 31.89 kg/m². VS Reviewed  General Appearance: A&O x 3, No acute distress,well developed and well- nourished  Head: normocephalic and atraumatic  Eyes: pupils equal, round, and reactive to light, extraocular eye movements intact, conjunctivae and eye lids without erythema  Neck: supple and non-tender without mass, no thyromegaly or thyroid nodules, no cervical lymphadenopathy  Pulmonary/Chest: clear to auscultation bilaterally- no wheezes, rales or rhonchi, normal air movement, no respiratory distress or retractions  Cardiovascular: S1 and S2 auscultated with regular rate and rhythm. No murmurs, rubs, clicks, or gallops, distal pulses intact. Abdomen: soft, non-tender, non-distended, bowl sounds physiologic,  no rebound or guarding, no masses or hernias noted. Liver and spleen without enlargement. Extremities: no cyanosis, clubbing or edema of the lower extremities. +2 PT/DP bilaterally. Musculoskeletal: No joint swelling or gross deformity   Left hand: full ROM of left thumb and fingers, no bony abnormality, demonstrated Finkelstein's maneuver and today it was negative, but patient reports that several weeks ago this type of maneuver would have been painful  Neuro:  Alert, 5/5 strength globally and symmetrically  Psych: Affect appropriate. Mood normal. Thought process is normal without evidence of depression or psychosis. Good insight and appropriate interaction. Cognition and memory appear to be intact. Skin: warm and dry, no rash or erythema  Lymph:  No cervical, auricular or supraclavicular lymph nodes palpated        ASSESSMENT & PLAN  Rey Hubbard was seen today for follow-up and wrist problem. Diagnoses and all orders for this visit:    Well adult health check    Essential hypertension  -     Comprehensive Metabolic Panel;  Future    Acquired hypothyroidism  -     TSH

## 2020-02-20 NOTE — PROGRESS NOTES
Immunization(s) given during visit:    Immunizations Administered     Name Date Dose Route    Pneumococcal Polysaccharide ([id number]) 2/20/2020 0.5 mL Intramuscular    Site: Deltoid- Right    Lot: [id number]    NDC: 6853-2514-16          Most recent Vaccine Information Sheet dated 10.30.2019 given to pt

## 2020-03-09 RX ORDER — LEVOTHYROXINE SODIUM 0.12 MG/1
TABLET ORAL
Qty: 90 TABLET | Refills: 0 | Status: SHIPPED | OUTPATIENT
Start: 2020-03-09 | End: 2020-05-29

## 2020-03-26 ENCOUNTER — TELEMEDICINE (OUTPATIENT)
Dept: FAMILY MEDICINE CLINIC | Age: 70
End: 2020-03-26
Payer: MEDICARE

## 2020-03-26 ENCOUNTER — TELEPHONE (OUTPATIENT)
Dept: FAMILY MEDICINE CLINIC | Age: 70
End: 2020-03-26

## 2020-03-26 PROCEDURE — 99214 OFFICE O/P EST MOD 30 MIN: CPT | Performed by: FAMILY MEDICINE

## 2020-03-26 NOTE — TELEPHONE ENCOUNTER
Can you please get more hx on what he's describing  Depending on what it is, it may not actually be appropriate for a video visit.

## 2020-03-26 NOTE — TELEPHONE ENCOUNTER
Pt's left eye for 1 week is having pain, redness, and his eye does water. There is no radiating pain.

## 2020-03-26 NOTE — PROGRESS NOTES
Chief Complaint   Patient presents with    Eye Pain     Left     Services were provided through a video synchronous discussion virtually to substitute for in-person clinic visit    Location of patient: Home  Location of physician: Office    Pursuant to the emergency declaration under the [address] [place] [address] waiver authority and the [employer] Act, this Virtual  Visit was conducted, with patient's consent, to reduce the patient's risk of exposure to COVID-19 and provide continuity of care for an established patient. Services were provided through a video synchronous discussion virtually to substitute for in-person clinic visit. Due to this being a TeleHealth encounter, evaluation of the following organ systems is limited      History obtained from the patient. SUBJECTIVE:  Rip Faustin is a 78 y.o. male that presents today for      L Eye Complaint:    HPI:   Felt something go in eye last wk  Over the last 3 days inc redness, pain and discharge in L eye  Drainage now in L eye  Inc photophobia  Inc pain the last day  Sig drainage  Does not wear contacts    Location - left eye   Redness - Yes  Burning - Yes  Matting or Drainage - Yes  Changes in vision - Yes  Painful - Yes  Photophobia - Yes  Inciting Event or Trauma - Yes  Associated Headache - No    Does patient wear contacts - No,     No significant prior ophthalmological history.          Current Outpatient Medications   Medication Sig Dispense Refill    levothyroxine (SYNTHROID) 125 MCG tablet Take 1 tablet by mouth once daily 90 tablet 0    spironolactone (ALDACTONE) 25 MG tablet TAKE 1 TABLET BY MOUTH ONCE DAILY 90 tablet 3    hydrochlorothiazide (MICROZIDE) 12.5 MG capsule Take 1 capsule by mouth daily 90 capsule 3    NIFEdipine (PROCARDIA XL) 30 MG extended release tablet TAKE 1 TABLET BY MOUTH ONCE DAILY 90 tablet 3    lisinopril (PRINIVIL;ZESTRIL) 40 MG tablet TAKE 1 TABLET BY MOUTH ONCE DAILY 90 tablet 3    metoprolol succinate (TOPROL XL) 25 MG extended release tablet TAKE 1 TABLET BY MOUTH ONCE DAILY 90 tablet 3    potassium chloride (KLOR-CON M) 20 MEQ extended release tablet Take 1 tablet by mouth daily 90 tablet 3    atorvastatin (LIPITOR) 40 MG tablet Take 1 tablet by mouth daily 90 tablet 3     No current facility-administered medications for this visit. No orders of the defined types were placed in this encounter. All medications reviewed and reconciled, including OTC and herbal medications. Updated list given to patient. Patient Active Problem List    Diagnosis Date Noted    Prediabetes 03/12/2019    Essential hypertension 04/05/2018    Dyslipidemia 04/05/2018    Acquired hypothyroidism 04/05/2018         Past Medical History:   Diagnosis Date    Dyslipidemia     Essential hypertension     Head trauma     Hypothyroidism     Prediabetes 3/12/2019    Stroke ([facility] [address] 1981         Past Surgical History:   Procedure Laterality Date    ANKLE SURGERY Right 07/2014         Allergies   Allergen Reactions    Penicillins Swelling    Sulfa Antibiotics Swelling         Social History     Tobacco Use    Smoking status: Never Smoker    Smokeless tobacco: Never Used   Substance Use Topics    Alcohol use: No         Family History   Problem Relation Age of Onset    Cancer Mother     Other Father 76        parkinsons         I have reviewed the patient's past medical history, past surgical history, allergies, medications, social and family history and I have made updates where appropriate.       Review of Systems  Positive responses are highlighted in bold    Constitutional:  Fever, Chills, Night Sweats, Fatigue, Unexpected changes in weight  HENT:  Ear pain, Tinnitus, Nosebleeds, Trouble swallowing, Hearing loss, Sore throat  Cardiovascular:  Chest Pain, Palpitations, Orthopnea, Paroxysmal Nocturnal Dyspnea  Respiratory:  Cough, Wheezing, Shortness of breath, Chest tightness, Apnea  Gastrointestinal:  Nausea, Vomiting, Diarrhea, Constipation, Heartburn, Blood in stool  Genitourinary:  Difficulty or painful urination, Flank pain, Change in frequency, Urgency  Skin:  Color change, Rash, Itching, Wound  Musculoskeletal:  Joint pain, Back pain, Gait problems, Joint swelling, Myalgias  Neurological:  Dizziness, Headaches, Presyncope, Numbness, Seizures, Tremors  Endocrine:  Heat Intolerance, Cold Intolerance, Polydipsia, Polyphagia, Polyuria      PHYSICAL EXAM:  Vitals not obtained, video visit  Pain Score:   5(L EYE)    General Appearance: A&O x 3, No acute distress,well developed and well- nourished  Head: normocephalic and atraumatic  Eyes: extraocular eye movements intact, conjunctivae appears red and eye lid appears swollen. Pulmonary/Chest: normal respiratory effort. Normal respiratory rate. No distress. Psych: Affect appropriate. Mood euthymic. Thought process is normal without evidence of depression or psychosis. Good insight and appropriate interaction. Cognition and memory appear to be intact. ASSESSMENT & PLAN  1. Abrasion of left cornea, initial encounter    Concern for abrasion vs ulcer based on hx and limited exam findings  Inc pain, photophobia, discharge etc    Spoke with Dr. Ilia Hernandez office, they will see him now    He will head over now to be seen  I appreciate their office's help greatly. All questions answered to pt    - MAMIE - Jamal David MD, Opthalmology, [facility].[place]    2. Foreign body of left eye, initial encounter    - MAMIE David MD, Opthalmology, [facility]    Return if symptoms worsen or fail to improve. PATIENT COUNSELING    Barriers to learning and self management: none    Discussed use, benefit, and side effects of prescribed medications. Barriers to medication compliance addressed. All patient questions answered. Pt voiced understanding.        Electronically signed by Layla Amador

## 2020-03-26 NOTE — TELEPHONE ENCOUNTER
Patient called and is requesting an appointment today. He stated it feels like he has something in his left x 1 week and it's watering and is a little bit red. Seems to getting worse every day. Please advise.

## 2020-05-28 ENCOUNTER — HOSPITAL ENCOUNTER (OUTPATIENT)
Age: 70
Discharge: HOME | End: 2020-05-28
Payer: MEDICARE

## 2020-05-28 DIAGNOSIS — I10 ESSENTIAL HYPERTENSION: ICD-10-CM

## 2020-05-28 DIAGNOSIS — R73.03 PREDIABETES: ICD-10-CM

## 2020-05-28 DIAGNOSIS — E78.5 DYSLIPIDEMIA: ICD-10-CM

## 2020-05-28 DIAGNOSIS — E03.9 ACQUIRED HYPOTHYROIDISM: ICD-10-CM

## 2020-05-28 LAB
ALBUMIN SERPL-MCNC: 4.2 G/DL (ref 3.5–5.1)
ALP BLD-CCNC: 84 U/L (ref 38–126)
ALT SERPL-CCNC: 20 U/L (ref 11–66)
ANION GAP SERPL CALCULATED.3IONS-SCNC: 12 MEQ/L (ref 8–16)
AST SERPL-CCNC: 23 U/L (ref 5–40)
AVERAGE GLUCOSE: 114 MG/DL (ref 70–126)
BILIRUB SERPL-MCNC: 0.6 MG/DL (ref 0.3–1.2)
BUN BLDV-MCNC: 23 MG/DL (ref 7–22)
CALCIUM SERPL-MCNC: 10.2 MG/DL (ref 8.5–10.5)
CHLORIDE BLD-SCNC: 106 MEQ/L (ref 98–111)
CHOLESTEROL, FASTING: 132 MG/DL (ref 100–199)
CO2: 26 MEQ/L (ref 23–33)
CREAT SERPL-MCNC: 1.3 MG/DL (ref 0.4–1.2)
GFR SERPL CREATININE-BSD FRML MDRD: 54 ML/MIN/1.73M2
GLUCOSE BLD-MCNC: 115 MG/DL (ref 70–108)
HBA1C MFR BLD: 5.8 % (ref 4.4–6.4)
HDLC SERPL-MCNC: 36 MG/DL
LDL CHOLESTEROL CALCULATED: 70 MG/DL
POTASSIUM SERPL-SCNC: 4.1 MEQ/L (ref 3.5–5.2)
SODIUM BLD-SCNC: 144 MEQ/L (ref 135–145)
T4 FREE: 1.12 NG/DL (ref 0.93–1.76)
TOTAL PROTEIN: 7.3 G/DL (ref 6.1–8)
TRIGLYCERIDE, FASTING: 130 MG/DL (ref 0–199)
TSH SERPL DL<=0.05 MIU/L-ACNC: 5.4 UIU/ML (ref 0.4–4.2)

## 2020-05-28 PROCEDURE — 84443 ASSAY THYROID STIM HORMONE: CPT

## 2020-05-28 PROCEDURE — 83036 HEMOGLOBIN GLYCOSYLATED A1C: CPT

## 2020-05-28 PROCEDURE — 80061 LIPID PANEL: CPT

## 2020-05-28 PROCEDURE — 80053 COMPREHEN METABOLIC PANEL: CPT

## 2020-05-28 PROCEDURE — 84439 ASSAY OF FREE THYROXINE: CPT

## 2020-05-28 PROCEDURE — 36415 COLL VENOUS BLD VENIPUNCTURE: CPT

## 2020-05-29 ENCOUNTER — TELEPHONE (OUTPATIENT)
Dept: FAMILY MEDICINE CLINIC | Age: 70
End: 2020-05-29

## 2020-05-29 RX ORDER — LEVOTHYROXINE SODIUM 137 UG/1
137 TABLET ORAL DAILY
Qty: 90 TABLET | Refills: 1 | Status: SHIPPED | OUTPATIENT
Start: 2020-05-29 | End: 2020-12-15

## 2020-08-26 ENCOUNTER — TELEPHONE (OUTPATIENT)
Dept: FAMILY MEDICINE CLINIC | Age: 70
End: 2020-08-26

## 2020-08-26 NOTE — TELEPHONE ENCOUNTER
2nd attempt to contact the pt re:sandhya Velazquez AC ordered on 2/20/20. HIPAA form is up to date, order mailed.

## 2020-09-24 ENCOUNTER — TELEPHONE (OUTPATIENT)
Dept: FAMILY MEDICINE CLINIC | Age: 70
End: 2020-09-24

## 2020-09-24 NOTE — TELEPHONE ENCOUNTER
----- Message from KATHRINE Nickerson CNP sent at 9/24/2020  1:11 PM EDT -----  Let Tracy Skiff know his cologuard test was negative. Will need to repeat in 3 years!

## 2020-12-15 RX ORDER — LEVOTHYROXINE SODIUM 137 UG/1
TABLET ORAL
Qty: 90 TABLET | Refills: 0 | Status: SHIPPED | OUTPATIENT
Start: 2020-12-15 | End: 2021-03-17

## 2020-12-17 ENCOUNTER — TELEPHONE (OUTPATIENT)
Dept: FAMILY MEDICINE CLINIC | Age: 70
End: 2020-12-17

## 2020-12-17 ENCOUNTER — NURSE ONLY (OUTPATIENT)
Dept: LAB | Age: 70
End: 2020-12-17

## 2020-12-17 LAB — TSH SERPL DL<=0.05 MIU/L-ACNC: 0.44 UIU/ML (ref 0.4–4.2)

## 2020-12-17 NOTE — TELEPHONE ENCOUNTER
----- Message from KATHRINE Cano - CNP sent at 12/17/2020  4:22 PM EST -----  Let Marci Cain know his thyroid lab is normal. con't current dose of Synthroid

## 2021-02-23 ENCOUNTER — OFFICE VISIT (OUTPATIENT)
Dept: FAMILY MEDICINE CLINIC | Age: 71
End: 2021-02-23
Payer: MEDICARE

## 2021-02-23 VITALS
TEMPERATURE: 97.5 F | HEIGHT: 74 IN | WEIGHT: 230 LBS | DIASTOLIC BLOOD PRESSURE: 80 MMHG | RESPIRATION RATE: 16 BRPM | OXYGEN SATURATION: 97 % | SYSTOLIC BLOOD PRESSURE: 128 MMHG | HEART RATE: 70 BPM | BODY MASS INDEX: 29.52 KG/M2

## 2021-02-23 DIAGNOSIS — I10 ESSENTIAL HYPERTENSION: ICD-10-CM

## 2021-02-23 DIAGNOSIS — E78.5 DYSLIPIDEMIA: ICD-10-CM

## 2021-02-23 DIAGNOSIS — E03.9 ACQUIRED HYPOTHYROIDISM: ICD-10-CM

## 2021-02-23 DIAGNOSIS — R73.03 PREDIABETES: ICD-10-CM

## 2021-02-23 DIAGNOSIS — G89.29 CHRONIC LEFT SHOULDER PAIN: ICD-10-CM

## 2021-02-23 DIAGNOSIS — Z00.00 ROUTINE GENERAL MEDICAL EXAMINATION AT A HEALTH CARE FACILITY: Primary | ICD-10-CM

## 2021-02-23 DIAGNOSIS — M25.512 CHRONIC LEFT SHOULDER PAIN: ICD-10-CM

## 2021-02-23 PROCEDURE — G0438 PPPS, INITIAL VISIT: HCPCS | Performed by: NURSE PRACTITIONER

## 2021-02-23 ASSESSMENT — PATIENT HEALTH QUESTIONNAIRE - PHQ9
2. FEELING DOWN, DEPRESSED OR HOPELESS: 0
SUM OF ALL RESPONSES TO PHQ9 QUESTIONS 1 & 2: 0

## 2021-02-23 NOTE — PROGRESS NOTES
Medicare Annual Wellness Visit  Name: Melinda Press Date: 2021   MRN: [id number] Sex: Male   Age: 69 y.o. Ethnicity: Non-/Non    : 1950 Race: Anayeli Buchanan is here for Medicare AWV and Annual Exam (no concerns)      HTN    Does patient check BP regularly at home? - Yes - 130/80  Current Medication regimen - HCTZ, Lisinopril, Toprol, Procardia Aldactone  Tolerating medications well? - yes    Shortness of breath or chest pain? No  Headache or visual complaints? No  Neurologic changes like confusion? No  Extremity edema? No    BP Readings from Last 3 Encounters:   21 128/80   20 122/66   19 132/82     Prediabetes  Trying to watch his diet, he is exercising walking and climbing stairs. He and his wife ride bikes during the summer. He had lost some weight and then gained it back over the holidays but is working on trying to lose some weight. Has been having left shoulder pain for a couple years. He can feel it grinding or creaking sound when he moves it. Pain is worse with raising his arm, particularly if he is lifting a heavy weight. No weakness in his arm, denies any paresthesias. Pain is not constant, but if he does do something to aggravate the pain it will last a couple weeks. It will usually resolve on it's own. Screenings for behavioral, psychosocial and functional/safety risks, and cognitive dysfunction are all negative except as indicated below. These results, as well as other patient data from the [address] form, are documented in Flowsheets linked to this Encounter. Allergies   Allergen Reactions    Penicillins Swelling    Sulfa Antibiotics Swelling         Prior to Visit Medications    Medication Sig Taking?  Authorizing Provider   levothyroxine (SYNTHROID) 137 MCG tablet Take 1 tablet by mouth once daily Yes KATHRINE Smith - CNP   lisinopril (PRINIVIL;ZESTRIL) 40 MG tablet Take 1 tablet by mouth once daily Yes Kenzie Abu, APRN - CNP   hydroCHLOROthiazide (MICROZIDE) 12.5 MG capsule Take 1 capsule by mouth once daily Yes KATHRINE Fuller CNP   NIFEdipine (PROCARDIA XL) 30 MG extended release tablet TAKE 1  BY MOUTH ONCE DAILY Yes KATHRINE Fuller CNP   spironolactone (ALDACTONE) 25 MG tablet Take 1 tablet by mouth once daily Yes KATHRINE Fuller CNP   metoprolol succinate (TOPROL XL) 25 MG extended release tablet Take 1 tablet by mouth once daily Yes KATHRINE Fuller CNP   potassium chloride (KLOR-CON M) 20 MEQ extended release tablet Take 1 tablet by mouth once daily Yes KATHRINE Fuller CNP   atorvastatin (LIPITOR) 40 MG tablet Take 1 tablet by mouth once daily Yes KATHRINE Fuller CNP   chlorthalidone (HYGROTON) 25 MG tablet Take 1 tablet by mouth daily  KATHRINE Fuller CNP   potassium chloride (KLOR-CON) 20 MEQ packet Take 20 mEq by mouth daily  Historical Provider, MD   lisinopril-hydrochlorothiazide (PRINZIDE;ZESTORETIC) 20-25 MG per tablet Take 2 tablets by mouth daily  KATHRINE Fuller CNP         Past Medical History:   Diagnosis Date    Dyslipidemia     Essential hypertension     Head trauma     Hypothyroidism     Prediabetes 3/12/2019    Stroke [facility]) 1981       Past Surgical History:   Procedure Laterality Date    ANKLE SURGERY Right 07/2014         Family History   Problem Relation Age of Onset    Cancer Mother     Other Father 76        parkinsons       CareTeam (Including outside providers/suppliers regularly involved in providing care):   Patient Care Team:  KATHRINE Fuller CNP as PCP - General (Family Medicine)  KATHRINE Fuller CNP as PCP - [facility] Empaneled Provider    Wt Readings from Last 3 Encounters:   02/23/21 230 lb (104.3 kg)   02/20/20 238 lb 6.4 oz (108.1 kg)   02/20/19 233 lb (105.7 kg)     Vitals:    02/23/21 1413   BP: 128/80   Pulse: 70   Resp: 16   Temp: 97.5 °F (36.4 °C)   SpO2: 97%   Weight: 230 lb (104.3 kg)   Height: 6' 1.5\" (1.867 m)     Body mass index is 29.93 kg/m². Based upon direct observation of the patient, evaluation of cognition reveals recent and remote memory intact. General Appearance: alert and oriented to person, place and time, well developed and well- nourished, in no acute distress  Skin: warm and dry, no rash or erythema  Head: normocephalic and atraumatic  Eyes: pupils equal, round, and reactive to light, extraocular eye movements intact, conjunctivae normal  ENT: tympanic membrane, external ear and ear canal normal bilaterally, nose without deformity, nasal mucosa and turbinates normal without polyps  Neck: supple and non-tender without mass, no thyromegaly or thyroid nodules, no cervical lymphadenopathy  Pulmonary/Chest: clear to auscultation bilaterally- no wheezes, rales or rhonchi, normal air movement, no respiratory distress  Cardiovascular: normal rate, regular rhythm, normal S1 and S2, no murmurs, rubs, clicks, or gallops, distal pulses intact, no carotid bruits  Abdomen: soft, non-tender, non-distended, normal bowel sounds, no masses or organomegaly  Extremities: no cyanosis, clubbing or edema  Musculoskeletal: normal range of motion, no joint swelling, deformity or tenderness  Neurologic: reflexes normal and symmetric, no cranial nerve deficit, gait, coordination and speech normal    Patient's complete Health Risk Assessment and screening values have been reviewed and are found in Flowsheets. The following problems were reviewed today and where indicated follow up appointments were made and/or referrals ordered. Positive Risk Factor Screenings with Interventions:            General Health and ACP:  General  In general, how would you say your health is?: Very Good  In the past 7 days, have you experienced any of the following?  New or Increased Pain, New or Increased Fatigue, Loneliness, Social Isolation, Stress or Anger?: None of These  Do you get the social and emotional support that you need?: Yes  Do you have a Living Will?: (!) No  Advance Directives     Power of [address] Will ACP-Advance Directive ACP-Power of     Not on File Not on File Not on File Not on File      General Health Risk Interventions:  · Poor self-assessment of health status: patient declines any further evaluation/treatment for this issue  · Pain issues: patient declines any further evaluation/treatment for this issue  · Fatigue: patient declines any further evaluation/treatment for this issue  · Loneliness: patient declines any further intervention for this issue  · Social isolation: patient declines any further intervention for this issue  · Stress: patient declines any further evaluation/treatment for this issue  · Anger: patient declines any further evaluation/treatment for this issue  · Inadequate social/emotional support: patient declines any further intervention for this issue  · No Living Will: information provided    Health Habits/Nutrition:  Health Habits/Nutrition  Do you exercise for at least 20 minutes 2-3 times per week?: Yes  Have you lost any weight without trying in the past 3 months?: No  Do you eat only one meal per day?: No  Have you seen the dentist within the past year?: (!) No  Body mass index: (!) 29.93  Health Habits/Nutrition Interventions:  · Inadequate physical activity:  working on exercising  · Nutritional issues:  working on eating healthy  · Dental exam overdue:  patient encouraged to make appointment with his/her dentist    Hearing/Vision:  No exam data present  Hearing/Vision  Do you or your family notice any trouble with your hearing that hasn't been managed with hearing aids?: (!) Yes  Do you have difficulty driving, watching TV, or doing any of your daily activities because of your eyesight?: No  Have you had an eye exam within the past year?: Yes    Safety:  Safety  Do you have working smoke detectors?: Yes  Have all throw rugs been removed or fastened?: (!) No  Do you have non-slip mats or surfaces in all bathtubs/showers?: (!) No  Do all of your stairways have a railing or banister?: Yes  Are your doorways, halls and stairs free of clutter?: Yes  Do you always fasten your seatbelt when you are in a car?: (!) No     Personalized Preventive Plan   Current Health Maintenance Status  Immunization History   Administered Date(s) Administered    Pneumococcal Conjugate 13-valent ([id number]) 04/11/2018    Pneumococcal Polysaccharide ([id number]) 02/20/2020        Health Maintenance   Topic Date Due    Hepatitis C screen  1950    COVID-19 Vaccine (1 of 2) 08/28/1965    DTaP/Tdap/Td vaccine (1 - Tdap) 08/28/1968    Shingles Vaccine (1 of 2) 08/28/1999    Annual Wellness Visit (AWV)  05/29/2019    Flu vaccine (1) 09/01/2020    A1C test (Diabetic or Prediabetic)  05/28/2021    Lipid screen  05/28/2021    TSH testing  12/17/2021    Potassium monitoring  02/05/2022    Creatinine monitoring  02/05/2022    Colon cancer screen fecal DNA test (Cologuard)  09/16/2023    Pneumococcal 65+ years Vaccine  Completed    Hepatitis A vaccine  Aged Out    Hepatitis B vaccine  Aged Out    Hib vaccine  Aged Out    Meningococcal (ACWY) vaccine  Aged Out     Recommendations for [place] Due: see orders and patient instructions/AVS.  . Recommended screening schedule for the next 5-10 years is provided to the patient in written form: see Patient Ronna Fung was seen today for medicare awv and annual exam.    Diagnoses and all orders for this visit:    Routine general medical examination at a health care facility    Essential hypertension  -     Comprehensive Metabolic Panel; Future  -     CBC With Auto Differential; Future  -     TSH With Reflex Ft4; Future  -     Lipid, Fasting; Future    Dyslipidemia  -     Comprehensive Metabolic Panel; Future  -     Lipid, Fasting;  Future    Acquired hypothyroidism  -     TSH With Reflex Ft4; Future    Prediabetes  - Hemoglobin A1C; Future    Chronic left shoulder pain  -     XR SHOULDER LEFT (MIN 2 VIEWS);  Future             - chronic conditions stable  - info provided for COVID vaccine  - annual labs  - no change to current meds  - con't working on healthy diet/exercise  - obtain shoulder xray

## 2021-02-23 NOTE — PATIENT INSTRUCTIONS
Patient Education        Learning About Living Rita Carla  What is a living will? A living will, also called a declaration, is a legal form. It tells your family and your doctor your wishes when you can't speak for yourself. It's used by the health professionals who will treat you as you near the end of your life or if you get seriously hurt or ill. If you put your wishes in writing, your loved ones and others will know what kind of care you want. They won't need to guess. This can ease your mind and be helpful to others. And you can change or cancel your living will at any time. A living will is not the same as an estate or property will. An estate will explains what you want to happen with your money and property after you die. How do you use it? A living will is used to describe the kinds of treatment or life support you want as you near the end of your life or if you get seriously hurt or ill. Keep these facts in mind about living johnson. · Your living will is used only if you can't speak or make decisions for yourself. Most often, one or more doctors must certify that you can't speak or decide for yourself before your living will takes effect. · If you get better and can speak for yourself again, you can accept or refuse any treatment. It doesn't matter what you said in your living will. · Some states may limit your right to refuse treatment in certain cases. For example, you may need to clearly state in your living will that you don't want artificial hydration and nutrition, such as being fed through a tube. Is a living will a legal document? A living will is a legal document. Each state has its own laws about living johnson. And a living will may be called something else in your state. Here are some things to know about living johnson. · You don't need an  to complete a living will. But legal advice can be helpful if your state's laws are unclear. It can also help if your health history is complicated or your family can't agree on what should be in your living will. · You can change your living will at any time. Some people find that their wishes about end-of-life care change as their health changes. If you make big changes to your living will, complete a new form. · If you move to another state, make sure that your living will is legal in the state where you now live. In most cases, doctors will respect your wishes even if you have a form from a different state. · You might use a universal form that has been approved by many states. This kind of form can sometimes be filled out and stored online. Your digital copy will then be available wherever you have a connection to the internet. The doctors and nurses who need to treat you can find it right away. · Your state may offer an online registry. This is another place where you can store your living will online. · It's a good idea to get your living will notarized. This means using a person called a  to watch two people sign, or witness, your living will. What should you know when you create a living will? Here are some questions to ask yourself as you make your living will:  · Do you know enough about life support methods that might be used? If not, talk to your doctor so you know what might be done if you can't breathe on your own, your heart stops, or you can't swallow. · What things would you still want to be able to do after you receive life-support methods? Would you want to be able to walk? To speak? To eat on your own? To live without the help of machines? · Do you want certain Anabaptism practices performed if you become very ill? · If you have a choice, where do you want to be cared for? In your home? At a hospital or nursing home? · If you have a choice at the end of your life, where would you prefer to die? At home? In a hospital or nursing home? Somewhere else? · Would you prefer to be buried or cremated? · Do you want your organs to be donated after you die? What should you do with your living will? · Make sure that your family members and your health care agent have copies of your living will (also called a declaration). · Give your doctor a copy of your living will. Ask him or her to keep it as part of your medical record. If you have more than one doctor, make sure that each one has a copy. · Put a copy of your living will where it can be easily found. For example, some people may put a copy on their refrigerator door. If you are using a digital copy, be sure your doctor, family members, and health care agent know how to find and access it. Where can you learn more? Go to https://[employer]peboo-box.[employer]. org and sign in to your TrendPo account. Enter T835 in the [facility] box to learn more about \"Learning About Living Perroy. \"     If you do not have an account, please click on the \"Sign Up Now\" link. Current as of: December 9, 2019               Content Version: 12.6  © 8034-5228 [employer]. Care instructions adapted under license by [facility] ([facility]). If you have questions about a medical condition or this instruction, always ask your healthcare professional. [place] [address] any warranty or liability for your use of this information. Advance Directives: Care Instructions  Overview  An advance directive is a legal way to state your wishes at the end of your life. It tells your family and your doctor what to do if you can't say what you want. There are two main types of advance directives. You can change them any time your wishes change. Living will. This form tells your family and your doctor your wishes about life support and other treatment. The form is also called a declaration. Medical power of . This form lets you name a person to make treatment decisions for you when you can't speak for yourself. This person is called a health care agent (health care proxy, health care surrogate). The form is also called a durable power of  for health care. If you do not have an advance directive, decisions about your medical care may be made by a family member, or by a doctor or a  who doesn't know you. It may help to think of an advance directive as a gift to the people who care for you. If you have one, they won't have to make tough decisions by themselves. Follow-up care is a key part of your treatment and safety. Be sure to make and go to all appointments, and call your doctor if you are having problems. It's also a good idea to know your test results and keep a list of the medicines you take. What should you include in an advance directive? Many states have a unique advance directive form. (It may ask you to address specific issues.) Or you might use a universal form that's approved by many states. If your form doesn't tell you what to address, it may be hard to know what to include in your advance directive. Use the questions below to help you get started. · Who do you want to make decisions about your medical care if you are not able to? · What life-support measures do you want if you have a serious illness that gets worse over time or can't be cured? · What are you most afraid of that might happen? (Maybe you're afraid of having pain, losing your independence, or being kept alive by machines.)  · Where would you prefer to die? (Your home? A hospital? A nursing home?)  · Do you want to donate your organs when you die? · Do you want certain Mosque practices performed before you die? When should you call for help? Be sure to contact your doctor if you have any questions. Where can you learn more? Go to https://chpepiceweb.[employer]. org and sign in to your Xeneta account. Enter R264 in the [facility] box to learn more about \"Advance Directives: Care Instructions. \"     If you do not have an account, please click on the \"Sign Up Now\" link. Current as of: December 9, 2019               Content Version: 12.6  © 0829-7517 [employer]. Care instructions adapted under license by [facility] ([facility]). If you have questions about a medical condition or this instruction, always ask your healthcare professional. [address] any warranty or liability for your use of this information. Advance Directives (02:51)  Your health professional recommends that you watch this short online health video. Learn how advance directives let others know your care preferences when you can't speak for yourself. How to watch the video    Scan the QR code   OR Visit the website    https://hwi. se/r/Glgnatfjnzqfm   Current as of: December 9, 2019               Content Version: 12.6  © 2006-2020 [employer]. Care instructions adapted under license by [facility] ([facility]). If you have questions about a medical condition or this instruction, always ask your healthcare professional. [address] any warranty or liability for your use of this information. Personalized Preventive Plan for Oswaldo Duncan - 2/23/2021  Medicare offers a range of preventive health benefits. Some of the tests and screenings are paid in full while other may be subject to a deductible, co-insurance, and/or copay. Some of these benefits include a comprehensive review of your medical history including lifestyle, illnesses that may run in your family, and various assessments and screenings as appropriate. After reviewing your medical record and screening and assessments performed today your provider may have ordered immunizations, labs, imaging, and/or referrals for you. A list of these orders (if applicable) as well as your Preventive Care list are included within your After Visit Summary for your review. Other Preventive Recommendations:    · A preventive eye exam performed by an eye specialist is recommended every 1-2 years to screen for glaucoma; cataracts, macular degeneration, and other eye disorders. · A preventive dental visit is recommended every 6 months. · Try to get at least 150 minutes of exercise per week or 10,000 steps per day on a pedometer . · Order or download the FREE \"Exercise & Physical Activity: Your Everyday Guide\" from [employer]. Call 0-383.522.2582 or search [employer] online. · You need 3456-8597 mg of calcium and 5402-6450 IU of vitamin D per day. It is possible to meet your calcium requirement with diet alone, but a vitamin D supplement is usually necessary to meet this goal.  · When exposed to the sun, use a sunscreen that protects against both UVA and UVB radiation with an SPF of 30 or greater. Reapply every 2 to 3 hours or after sweating, drying off with a towel, or swimming. · Always wear a seat belt when traveling in a car. Always wear a helmet when riding a bicycle or motorcycle.

## 2021-03-04 ENCOUNTER — IMMUNIZATION (OUTPATIENT)
Dept: PRIMARY CARE CLINIC | Age: 71
End: 2021-03-04
Payer: MEDICARE

## 2021-03-04 PROCEDURE — 91300 COVID-19, PFIZER VACCINE 30MCG/0.3ML DOSE: CPT

## 2021-03-04 PROCEDURE — 0001A COVID-19, PFIZER VACCINE 30MCG/0.3ML DOSE: CPT

## 2021-03-17 ENCOUNTER — TELEPHONE (OUTPATIENT)
Dept: FAMILY MEDICINE CLINIC | Age: 71
End: 2021-03-17

## 2021-03-17 DIAGNOSIS — E11.9 TYPE 2 DIABETES MELLITUS WITHOUT COMPLICATION, WITHOUT LONG-TERM CURRENT USE OF INSULIN (HCC): Primary | ICD-10-CM

## 2021-03-17 DIAGNOSIS — E03.9 ACQUIRED HYPOTHYROIDISM: ICD-10-CM

## 2021-03-17 LAB
A/G RATIO: 1.4 (ref 1.5–2.5)
ABSOLUTE BASO #: 100 /CMM (ref 0–200)
ABSOLUTE EOS #: 100 /CMM (ref 0–500)
ABSOLUTE LYMPH #: 1600 /CMM (ref 1000–4800)
ABSOLUTE MONO #: 700 /CMM (ref 0–800)
ABSOLUTE NEUT #: 3400 /CMM (ref 1800–7700)
ALBUMIN SERPL-MCNC: 4 GM/DL (ref 3.5–5)
ALP BLD-CCNC: 88 IU/L (ref 41–137)
ALT SERPL-CCNC: 13 IU/L (ref 10–40)
ANION GAP SERPL CALCULATED.3IONS-SCNC: 4 MMOL/L (ref 4–12)
AST SERPL-CCNC: 16 IU/L (ref 15–41)
BASOPHILS RELATIVE PERCENT: 1 % (ref 0–2)
BILIRUB SERPL-MCNC: 0.6 MG/DL (ref 0.2–1)
BUN BLDV-MCNC: 23 MG/DL (ref 7–20)
CALCIUM SERPL-MCNC: 9.6 MG/DL (ref 8.8–10.5)
CHLORIDE BLD-SCNC: 104 MEQ/L (ref 101–111)
CHOLESTEROL, TOTAL: 111 MG/DL
CHOLESTEROL/HDL RATIO: 2.9
CHOLESTEROL/HDL RELATIVE RISK: 2.9 (ref 4–5)
CHOLESTEROL: 111 MG/DL
CO2: 31 MEQ/L (ref 21–32)
CREAT SERPL-MCNC: 1.44 MG/DL (ref 0.6–1.3)
CREATININE CLEARANCE: 48
DIRECT-LDL / HDL RISK: 1.6
EOSINOPHILS RELATIVE PERCENT: 2.1 % (ref 0–6)
ESTIMATED AVERAGE GLUCOSE: 140 MG/DL
GLUCOSE: 100 MG/DL (ref 70–110)
HBA1C MFR BLD: 6.5 % (ref 4.4–6.4)
HCT VFR BLD CALC: 40.7 % (ref 40–49)
HDLC SERPL-MCNC: 38 MG/DL
HDLC SERPL-MCNC: 38 MG/DL (ref 35–70)
HEMOGLOBIN: 14 GM/DL (ref 13.5–16.5)
LDL CHOLESTEROL CALCULATED: 64 MG/DL (ref 0–160)
LDL CHOLESTEROL DIRECT: 64 MG/DL
LYMPHOCYTES RELATIVE PERCENT: 27.6 % (ref 15–45)
MCH RBC QN AUTO: 31 PG (ref 27.5–33)
MCHC RBC AUTO-ENTMCNC: 34.3 GM/DL (ref 33–36)
MCV RBC AUTO: 90.4 CU MIC (ref 80–97)
MONOCYTES RELATIVE PERCENT: 11.7 % (ref 2–10)
NEUTROPHILS RELATIVE PERCENT: 57.6 % (ref 40–70)
NONHDLC SERPL-MCNC: NORMAL MG/DL
NUCLEATED RBCS: 0.1 /100 WBC
PDW BLD-RTO: 13.5 % (ref 12–16)
PLATELET # BLD: 186 TH/CMM (ref 150–400)
POTASSIUM SERPL-SCNC: 4.2 MEQ/L (ref 3.6–5)
RBC # BLD: 4.51 MIL/CMM (ref 4.5–6)
SODIUM BLD-SCNC: 139 MEQ/L (ref 135–145)
TOTAL PROTEIN: 6.8 G/DL (ref 6.2–8)
TRIGL SERPL-MCNC: 94 MG/DL
TRIGL SERPL-MCNC: 94 MG/DL
TSH REFLEX: 1.19 MCIU/ML (ref 0.49–4.67)
VLDLC SERPL CALC-MCNC: 18 MG/DL
VLDLC SERPL CALC-MCNC: 18 MG/DL
WBC # BLD: 5.9 TH/CMM (ref 4.4–10.5)

## 2021-03-17 RX ORDER — LEVOTHYROXINE SODIUM 137 UG/1
TABLET ORAL
Qty: 90 TABLET | Refills: 3 | Status: SHIPPED | OUTPATIENT
Start: 2021-03-17 | End: 2022-03-17

## 2021-03-17 RX ORDER — METFORMIN HYDROCHLORIDE 500 MG/1
500 TABLET, EXTENDED RELEASE ORAL
Qty: 90 TABLET | Refills: 1 | Status: SHIPPED | OUTPATIENT
Start: 2021-03-17 | End: 2021-05-25 | Stop reason: ALTCHOICE

## 2021-03-17 NOTE — TELEPHONE ENCOUNTER
Recent Visits  Date Type Provider Dept   02/23/21 Office Visit KATHRINE Serrano CNP [facility]   02/20/20 Office Visit KATHRINE Serrano CNP [facility]   Showing recent visits within past 540 days with a meds authorizing provider and meeting all other requirements     Future Appointments  No visits were found meeting these conditions.    Showing future appointments within next 150 days with a meds authorizing provider and meeting all other requirements     Future Appointments   Date Time Provider Umu Reynoso   3/25/2021  8:10 AM [facility] 21 DAY SECOND DO[facility] [place]   2/24/2022  2:00 PM KATHRINE Serrano

## 2021-03-17 NOTE — TELEPHONE ENCOUNTER
----- Message from KATHRINE Latham CNP sent at 3/17/2021 12:26 PM EDT -----  Let Sis Espitia know his labs are all stable and within appropriate ranges. Sent pt a my chart msg with results.

## 2021-03-18 ENCOUNTER — TELEPHONE (OUTPATIENT)
Dept: FAMILY MEDICINE CLINIC | Age: 71
End: 2021-03-18

## 2021-03-18 NOTE — TELEPHONE ENCOUNTER
----- Message from KATHRINE King CNP sent at 3/17/2021  4:24 PM EDT -----  Let Ashley know his A1C came back at 6.5 which does fall in the diabetic range. His A1C (dm test which measures average glucose over the last 3 months) previously was running 5.8-6.1. i'd like to start him on low dose Metformin which will help get the sugars down a little, also helps a little with weight loss. It can upset the stomach and given a little diarrhea, but i'm starting him on very low dose. He doesn't need to check sugars, but does need to work on diet/exercise. i'd like to see him back in 3 months, and we'll repeat the A1C in the office then.

## 2021-03-25 ENCOUNTER — IMMUNIZATION (OUTPATIENT)
Dept: PRIMARY CARE CLINIC | Age: 71
End: 2021-03-25
Payer: MEDICARE

## 2021-03-25 PROCEDURE — 91300 COVID-19, PFIZER VACCINE 30MCG/0.3ML DOSE: CPT

## 2021-03-25 PROCEDURE — 0002A COVID-19, PFIZER VACCINE 30MCG/0.3ML DOSE: CPT

## 2021-05-25 ENCOUNTER — OFFICE VISIT (OUTPATIENT)
Dept: FAMILY MEDICINE CLINIC | Age: 71
End: 2021-05-25
Payer: MEDICARE

## 2021-05-25 VITALS
TEMPERATURE: 98.8 F | RESPIRATION RATE: 10 BRPM | DIASTOLIC BLOOD PRESSURE: 88 MMHG | OXYGEN SATURATION: 95 % | BODY MASS INDEX: 29.79 KG/M2 | HEIGHT: 73 IN | WEIGHT: 224.8 LBS | HEART RATE: 82 BPM | SYSTOLIC BLOOD PRESSURE: 124 MMHG

## 2021-05-25 DIAGNOSIS — R73.03 PREDIABETES: Primary | ICD-10-CM

## 2021-05-25 LAB
HBA1C MFR BLD: 5.8 % (ref 4.3–5.7)
MICROALB/CREAT RATIO POC: < 30 MG/G
MICROALBUMIN/CREAT UR-RTO: 30 MG/L
POC CREATININE: 300 MG/DL

## 2021-05-25 PROCEDURE — 99213 OFFICE O/P EST LOW 20 MIN: CPT | Performed by: NURSE PRACTITIONER

## 2021-05-25 PROCEDURE — 82044 UR ALBUMIN SEMIQUANTITATIVE: CPT | Performed by: NURSE PRACTITIONER

## 2021-05-25 SDOH — ECONOMIC STABILITY: TRANSPORTATION INSECURITY
IN THE PAST 12 MONTHS, HAS THE LACK OF TRANSPORTATION KEPT YOU FROM MEDICAL APPOINTMENTS OR FROM GETTING MEDICATIONS?: NO

## 2021-05-25 SDOH — ECONOMIC STABILITY: TRANSPORTATION INSECURITY
IN THE PAST 12 MONTHS, HAS LACK OF TRANSPORTATION KEPT YOU FROM MEETINGS, WORK, OR FROM GETTING THINGS NEEDED FOR DAILY LIVING?: NO

## 2021-05-25 SDOH — ECONOMIC STABILITY: FOOD INSECURITY: WITHIN THE PAST 12 MONTHS, THE FOOD YOU BOUGHT JUST DIDN'T LAST AND YOU DIDN'T HAVE MONEY TO GET MORE.: NEVER TRUE

## 2021-05-25 ASSESSMENT — SOCIAL DETERMINANTS OF HEALTH (SDOH): HOW HARD IS IT FOR YOU TO PAY FOR THE VERY BASICS LIKE FOOD, HOUSING, MEDICAL CARE, AND HEATING?: NOT HARD AT ALL

## 2021-05-25 NOTE — PROGRESS NOTES
Component Value Date    LABA1C 5.8 (H) 05/25/2021       Colon Cancer Screening - 49, FIT test given  Lung Cancer Screening (Age 54 to [de-identified] with 30 pack year hx, current smoker or quit within past 15 years) - n/a    Tetanus - needs  Influenza Vaccine - 9/18  Pneumonia Vaccine - Prevnar 4/11/18 Pneumovax 2/20/20  Zostavax - needs     PSA testing discussion - 49  AAA Screening - needs    Falls screening - n/a    Current Outpatient Medications   Medication Sig Dispense Refill    atorvastatin (LIPITOR) 40 MG tablet Take 1 tablet by mouth once daily 90 tablet 3    levothyroxine (SYNTHROID) 137 MCG tablet Take 1 tablet by mouth once daily 90 tablet 3    lisinopril (PRINIVIL;ZESTRIL) 40 MG tablet Take 1 tablet by mouth once daily 90 tablet 3    hydroCHLOROthiazide (MICROZIDE) 12.5 MG capsule Take 1 capsule by mouth once daily 90 capsule 3    NIFEdipine (PROCARDIA XL) 30 MG extended release tablet TAKE 1  BY MOUTH ONCE DAILY 90 tablet 3    spironolactone (ALDACTONE) 25 MG tablet Take 1 tablet by mouth once daily 90 tablet 3    metoprolol succinate (TOPROL XL) 25 MG extended release tablet Take 1 tablet by mouth once daily 90 tablet 3    potassium chloride (KLOR-CON M) 20 MEQ extended release tablet Take 1 tablet by mouth once daily 90 tablet 3     No current facility-administered medications for this visit. No orders of the defined types were placed in this encounter. All medications reviewed and reconciled, including OTC and herbal medications. Updated list given to patient.        Patient Active Problem List   Diagnosis    Essential hypertension    Dyslipidemia    Acquired hypothyroidism    Prediabetes       Past Medical History:   Diagnosis Date    Dyslipidemia     Essential hypertension     Head trauma     Hypothyroidism     Prediabetes 3/12/2019    Stroke [facility]) 1981       Past Surgical History:   Procedure Laterality Date    ANKLE SURGERY Right 07/2014       Allergies   Allergen Reactions Fatigue, Unexpected changes in weight  Eyes:  Eye discharge, Eye pain, Eye redness, Visual disturbances   HENT:  Ear pain, Tinnitus, Nosebleeds, Trouble swallowing, Hearing loss, Sore throat  Cardiovascular:  Chest Pain, Palpitations, Orthopnea, Paroxysmal Nocturnal Dyspnea  Respiratory:  Cough, Wheezing, Shortness of breath, Chest tightness, Apnea  Gastrointestinal:  Nausea, Vomiting, Diarrhea, Constipation, Heartburn, Blood in stool  Genitourinary:  Difficulty or painful urination, Flank pain, Change in frequency, Urgency  Skin:  Color change, Rash, Itching, Wound  Psychiatric:  Hallucinations, Anxiety, Depression, Suicidal ideation  Hematological:  Enlarged glands, Easy bleeding, Easily bruising  Musculoskeletal:  Joint pain, Back pain, Gait problems, Joint swelling, Myalgias  Neurological:  Dizziness, Headaches, Presyncope, Numbness, Seizures, Tremors  Allergy:  Environmental allergies, Food allergies  Endocrine:  Heat Intolerance, Cold Intolerance, Polydipsia, Polyphagia, Polyuria    Lab Results   Component Value Date     03/17/2021    K 4.2 03/17/2021     03/17/2021    CO2 31 03/17/2021    BUN 23 (H) 03/17/2021    CREATININE 1.44 (H) 03/17/2021    GLUCOSE 100 03/17/2021    CALCIUM 9.60 03/17/2021    PROT 6.8 03/17/2021    LABALBU 4.0 03/17/2021    BILITOT 0.6 03/17/2021    ALKPHOS 88 03/17/2021    AST 16 03/17/2021    ALT 13 03/17/2021    LABGLOM 54 (A) 05/28/2020    AGRATIO 1.4 (L) 03/17/2021       Lab Results   Component Value Date    WBC 5.9 03/17/2021    HGB 14.0 03/17/2021    HCT 40.7 03/17/2021    MCV 90.4 03/17/2021     03/17/2021       PHYSICAL EXAM:  Vitals:    05/25/21 1428   BP: 124/88   Pulse: 82   Resp: 10   Temp: 98.8 °F (37.1 °C)   TempSrc: Oral   SpO2: 95%   Weight: 224 lb 12.8 oz (102 kg)   Height: 6' 1.43\" (1.865 m)     Body mass index is 29.32 kg/m².          VS Reviewed  General Appearance: A&O x 3, No acute distress,well developed and well- nourished  Head: normocephalic prediabetes. Jen Christian released without restrictions. PATIENT COUNSELING    Counseling was provided today regarding the following topics: Healthy eating habits, Regular exercise, substance abuse and healthy sleep habits. Jen Christian received counseling on the following healthy behaviors: medication adherence    Patient given educational materials on: See Attached    I have instructed Jen Christian to complete a self tracking handout on Blood Pressures  and instructed them to bring it with them to his next appointment. Barriers to learning and self management: none    Discussed use, benefit, and side effects of prescribed medications. Barriers to medication compliance addressed. All patient questions answered. Pt voiced understanding.        Electronically signed by KATHRINE Ceballos CNP on 5/25/2021 at 2:39 PM

## 2021-06-30 ENCOUNTER — VIRTUAL VISIT (OUTPATIENT)
Dept: FAMILY MEDICINE CLINIC | Age: 71
End: 2021-06-30
Payer: MEDICARE

## 2021-06-30 DIAGNOSIS — M54.41 ACUTE RIGHT-SIDED LOW BACK PAIN WITH RIGHT-SIDED SCIATICA: Primary | ICD-10-CM

## 2021-06-30 PROCEDURE — 99441 PR PHYS/QHP TELEPHONE EVALUATION 5-10 MIN: CPT | Performed by: NURSE PRACTITIONER

## 2021-06-30 RX ORDER — PREDNISONE 20 MG/1
40 TABLET ORAL DAILY
Qty: 10 TABLET | Refills: 0 | Status: SHIPPED | OUTPATIENT
Start: 2021-06-30 | End: 2021-07-05

## 2021-06-30 RX ORDER — TIZANIDINE 4 MG/1
4 TABLET ORAL EVERY 8 HOURS PRN
Qty: 30 TABLET | Refills: 0 | Status: SHIPPED | OUTPATIENT
Start: 2021-06-30 | End: 2022-02-24 | Stop reason: ALTCHOICE

## 2021-06-30 RX ORDER — TRAMADOL HYDROCHLORIDE 50 MG/1
50 TABLET ORAL EVERY 6 HOURS PRN
Qty: 20 TABLET | Refills: 0 | Status: SHIPPED | OUTPATIENT
Start: 2021-06-30 | End: 2021-07-05

## 2021-09-15 DIAGNOSIS — I10 ESSENTIAL HYPERTENSION: ICD-10-CM

## 2021-09-15 RX ORDER — LISINOPRIL 40 MG/1
TABLET ORAL
Qty: 90 TABLET | Refills: 3 | Status: SHIPPED | OUTPATIENT
Start: 2021-09-15 | End: 2022-09-14

## 2021-09-28 ENCOUNTER — HOSPITAL ENCOUNTER (OUTPATIENT)
Dept: GENERAL RADIOLOGY | Age: 71
Discharge: HOME | End: 2021-09-28
Payer: MEDICARE

## 2021-09-28 ENCOUNTER — HOSPITAL ENCOUNTER (OUTPATIENT)
Age: 71
End: 2021-09-28
Payer: MEDICARE

## 2021-09-28 ENCOUNTER — HOSPITAL ENCOUNTER (OUTPATIENT)
Age: 71
Discharge: HOME | End: 2021-09-28
Payer: MEDICARE

## 2021-09-28 DIAGNOSIS — M25.512 CHRONIC LEFT SHOULDER PAIN: ICD-10-CM

## 2021-09-28 DIAGNOSIS — G89.29 CHRONIC LEFT SHOULDER PAIN: ICD-10-CM

## 2021-09-28 PROCEDURE — 73030 X-RAY EXAM OF SHOULDER: CPT

## 2021-09-29 ENCOUNTER — TELEPHONE (OUTPATIENT)
Dept: FAMILY MEDICINE CLINIC | Age: 71
End: 2021-09-29

## 2021-09-29 NOTE — TELEPHONE ENCOUNTER
----- Message from KATHRINE Maya CNP sent at 9/29/2021  8:39 AM EDT -----  Let Gayatri Blackman know his shoulder xray shows degenerative changes involving the shoulder joint. I would rec'd perhaps seeing ortho, if he's interested. Let me know.

## 2021-11-23 ENCOUNTER — OFFICE VISIT (OUTPATIENT)
Dept: FAMILY MEDICINE CLINIC | Age: 71
End: 2021-11-23
Payer: MEDICARE

## 2021-11-23 VITALS
DIASTOLIC BLOOD PRESSURE: 80 MMHG | SYSTOLIC BLOOD PRESSURE: 128 MMHG | HEIGHT: 73 IN | RESPIRATION RATE: 14 BRPM | WEIGHT: 215.2 LBS | HEART RATE: 72 BPM | TEMPERATURE: 97.4 F | BODY MASS INDEX: 28.52 KG/M2

## 2021-11-23 DIAGNOSIS — R73.03 PREDIABETES: Primary | ICD-10-CM

## 2021-11-23 DIAGNOSIS — E03.9 ACQUIRED HYPOTHYROIDISM: ICD-10-CM

## 2021-11-23 DIAGNOSIS — I10 ESSENTIAL HYPERTENSION: ICD-10-CM

## 2021-11-23 DIAGNOSIS — M19.012 PRIMARY OSTEOARTHRITIS OF LEFT SHOULDER: ICD-10-CM

## 2021-11-23 DIAGNOSIS — G89.29 CHRONIC LEFT SHOULDER PAIN: ICD-10-CM

## 2021-11-23 DIAGNOSIS — M25.512 CHRONIC LEFT SHOULDER PAIN: ICD-10-CM

## 2021-11-23 LAB — HBA1C MFR BLD: 5.7 % (ref 4.3–5.7)

## 2021-11-23 PROCEDURE — 99213 OFFICE O/P EST LOW 20 MIN: CPT | Performed by: NURSE PRACTITIONER

## 2021-11-23 NOTE — PROGRESS NOTES
Chief Complaint   Patient presents with    6 Month Follow-Up     had shoulder xray done recently. woud like to discuss    Discuss Medications     diabetic med- unsure of name       History obtained from chart review and the patient. SUBJECTIVE:  Jules Santiago is a 66 y.o. male that presents today for follow up DM    Diabetes Type 2    Glucose control:   Does patient check blood glucoses at home? No  Report of hypoglycemia: no  Lab Results   Component Value Date    LABA1C 5.7 11/23/2021     Lab Results   Component Value Date     03/17/2021       Symptoms  Polyuria, Polydipsia or Polyphagia? No  Chest Pain, SOB, or Palpitations? -  No  New Vision complaints? No  Paresthesias of the extremities? No    Medications  Current medication were reviewed. Compliant with medications? Yes  Medication side effects? No  On ACE-I or ARB? Yes  On antiplatelet therapy? No  On Statin? Yes    Last Diabetic Eye Exam: needs    Exercise  Exercise? Yes, he is riding the bike, he is trying to lose weight  Wt Readings from Last 3 Encounters:   11/23/21 215 lb 3.2 oz (97.6 kg)   05/25/21 224 lb 12.8 oz (102 kg)   02/23/21 230 lb (104.3 kg)       Diet discipline?:  Low salt, fat, sugar diet? Yes cut back on the sweets    Blood pressure control:  BP Readings from Last 3 Encounters:   11/23/21 128/80   05/25/21 124/88   02/23/21 128/80       No results found for: Jimmy Van    Lab Results   Component Value Date    LDLCALC 64 03/17/2021    LDLDIRECT 64 03/17/2021     He is still having pain in his left shoulder. Some days are better than others, depends upon his activity level. He has difficulty with it popping grinding particularly if he picks up heavy objects. He is unable to hunt which he enjoys because he can't hardly pick the bow up or even pull it back.     Age/Gender Health Maintenance    Lipid   Lab Results   Component Value Date    CHOL 111 03/17/2021    CHOL 111 03/17/2021    CHOL 153 03/12/2019 Lab Results   Component Value Date    TRIG 94 03/17/2021    TRIG 94 03/17/2021    TRIG 124 03/12/2019     Lab Results   Component Value Date    HDL 38 (L) 03/17/2021    HDL 38 03/17/2021    HDL 36 05/28/2020     Lab Results   Component Value Date    LDLCALC 64 03/17/2021    LDLCALC 70 05/28/2020     Lab Results   Component Value Date    VLDL 18 03/17/2021    VLDL 18 03/17/2021    VLDL 24 03/12/2019     DM Screen -   Lab Results   Component Value Date    LABA1C 5.7 11/23/2021       Colon Cancer Screening - 49, FIT test given  Lung Cancer Screening (Age 54 to [de-identified] with 30 pack year hx, current smoker or quit within past 15 years) - n/a    Tetanus - needs  Influenza Vaccine - 9/18  Pneumonia Vaccine - Prevnar 4/11/18 Pneumovax 2/20/20  Zostavax - needs     PSA testing discussion - 49  AAA Screening - needs    Falls screening - n/a    Current Outpatient Medications   Medication Sig Dispense Refill    lisinopril (PRINIVIL;ZESTRIL) 40 MG tablet Take 1 tablet by mouth once daily 90 tablet 3    spironolactone (ALDACTONE) 25 MG tablet Take 1 tablet by mouth once daily 90 tablet 3    hydroCHLOROthiazide (MICROZIDE) 12.5 MG capsule Take 1 capsule by mouth once daily 90 capsule 3    NIFEdipine (PROCARDIA XL) 30 MG extended release tablet Take 1 tablet by mouth once daily 90 tablet 3    metoprolol succinate (TOPROL XL) 25 MG extended release tablet Take 1 tablet by mouth once daily 90 tablet 3    potassium chloride (KLOR-CON M) 20 MEQ extended release tablet Take 1 tablet by mouth once daily 90 tablet 3    tiZANidine (ZANAFLEX) 4 MG tablet Take 1 tablet by mouth every 8 hours as needed (back pain,muscle spasm) 30 tablet 0    atorvastatin (LIPITOR) 40 MG tablet Take 1 tablet by mouth once daily 90 tablet 3    levothyroxine (SYNTHROID) 137 MCG tablet Take 1 tablet by mouth once daily 90 tablet 3     No current facility-administered medications for this visit.      No orders of the defined types were placed in this encounter. All medications reviewed and reconciled, including OTC and herbal medications. Updated list given to patient. Patient Active Problem List   Diagnosis    Essential hypertension    Dyslipidemia    Acquired hypothyroidism    Prediabetes    Primary osteoarthritis of left shoulder    Chronic left shoulder pain       Past Medical History:   Diagnosis Date    Dyslipidemia     Essential hypertension     Head trauma     Hypothyroidism     Prediabetes 3/12/2019    Stroke ([facility] [address] 1981       Past Surgical History:   Procedure Laterality Date    ANKLE SURGERY Right 07/2014       Allergies   Allergen Reactions    Penicillins Swelling    Sulfa Antibiotics Swelling       Social History     Socioeconomic History    Marital status:      Spouse name: Not on file    Number of children: Not on file    Years of education: Not on file    Highest education level: Not on file   Occupational History    Not on file   Tobacco Use    Smoking status: Never Smoker    Smokeless tobacco: Never Used   Substance and Sexual Activity    Alcohol use: No    Drug use: No    Sexual activity: Not on file   Other Topics Concern    Not on file   Social History Narrative    Not on file     Social Determinants of Health     Financial Resource Strain: Low Risk     Difficulty of Paying Living Expenses: Not hard at all   Food Insecurity: No Food Insecurity    Worried About Running Out of Food in the Last Year: Never true    Breanne of Food in the Last Year: Never true   Transportation Needs: No Transportation Needs    Lack of Transportation (Medical): No    Lack of Transportation (Non-Medical):  No   Physical Activity:     Days of Exercise per Week: Not on file    Minutes of Exercise per Session: Not on file   Stress:     Feeling of Stress : Not on file   Social Connections:     Frequency of Communication with Friends and Family: Not on file    Frequency of Social Gatherings with Friends and Family: Not on file    Attends Yarsanism Services: Not on file    Active Member of Clubs or Organizations: Not on file    Attends Club or Organization Meetings: Not on file    Marital Status: Not on file   Intimate Partner Violence:     Fear of Current or Ex-Partner: Not on file    Emotionally Abused: Not on file    Physically Abused: Not on file    Sexually Abused: Not on file   Housing Stability: Unknown    Unable to Pay for Housing in the Last Year: No    Number of [place] in the Last Year: Not on file    Unstable Housing in the Last Year: No       Family History   Problem Relation Age of Onset    Cancer Mother    [facility] Other Father 76        parkinsons         I have reviewed the patient's past medical history, past surgical history, allergies, medications, social and family history and I have made updates where appropriate.       Review of Systems  Positive responses are highlighted in bold    Constitutional:  Fever, Chills, Night Sweats, Fatigue, Unexpected changes in weight  Eyes:  Eye discharge, Eye pain, Eye redness, Visual disturbances   HENT:  Ear pain, Tinnitus, Nosebleeds, Trouble swallowing, Hearing loss, Sore throat  Cardiovascular:  Chest Pain, Palpitations, Orthopnea, Paroxysmal Nocturnal Dyspnea  Respiratory:  Cough, Wheezing, Shortness of breath, Chest tightness, Apnea  Gastrointestinal:  Nausea, Vomiting, Diarrhea, Constipation, Heartburn, Blood in stool  Genitourinary:  Difficulty or painful urination, Flank pain, Change in frequency, Urgency  Skin:  Color change, Rash, Itching, Wound  Psychiatric:  Hallucinations, Anxiety, Depression, Suicidal ideation  Hematological:  Enlarged glands, Easy bleeding, Easily bruising  Musculoskeletal:  Joint pain, Back pain, Gait problems, Joint swelling, Myalgias  Neurological:  Dizziness, Headaches, Presyncope, Numbness, Seizures, Tremors  Allergy:  Environmental allergies, Food allergies  Endocrine:  Heat Intolerance, Cold Intolerance, Polydipsia, Polyphagia, Polyuria    Lab Results   Component Value Date     03/17/2021    K 4.2 03/17/2021     03/17/2021    CO2 31 03/17/2021    BUN 23 (H) 03/17/2021    CREATININE 300 05/25/2021    GLUCOSE 100 03/17/2021    CALCIUM 9.60 03/17/2021    PROT 6.8 03/17/2021    LABALBU 4.0 03/17/2021    BILITOT 0.6 03/17/2021    ALKPHOS 88 03/17/2021    AST 16 03/17/2021    ALT 13 03/17/2021    LABGLOM 54 (A) 05/28/2020    AGRATIO 1.4 (L) 03/17/2021       Lab Results   Component Value Date    WBC 5.9 03/17/2021    HGB 14.0 03/17/2021    HCT 40.7 03/17/2021    MCV 90.4 03/17/2021     03/17/2021     Left shoulder xray 11/23/21  FINDINGS: There is no fracture or dislocation. Joint space narrowing, sclerosis and osteophyte formation are present at the acromioclavicular and glenohumeral joints. Soft tissues are unremarkable. PHYSICAL EXAM:  Vitals:    11/23/21 1426   BP: 128/80   Pulse: 72   Resp: 14   Temp: 97.4 °F (36.3 °C)   TempSrc: Oral   Weight: 215 lb 3.2 oz (97.6 kg)   Height: 6' 1.43\" (1.865 m)     Body mass index is 28.06 kg/m². VS Reviewed  General Appearance: A&O x 3, No acute distress,well developed and well- nourished  Head: normocephalic and atraumatic  Eyes: pupils equal, round, and reactive to light, extraocular eye movements intact, conjunctivae and eye lids without erythema  Neck: supple and non-tender without mass, no thyromegaly or thyroid nodules, no cervical lymphadenopathy  Pulmonary/Chest: clear to auscultation bilaterally- no wheezes, rales or rhonchi, normal air movement, no respiratory distress or retractions  Cardiovascular: S1 and S2 auscultated with regular rate and rhythm. No murmurs, rubs, clicks, or gallops, distal pulses intact. Abdomen: soft, non-tender, non-distended, bowl sounds physiologic,  no rebound or guarding, no masses or hernias noted. Liver and spleen without enlargement.    Extremities: no cyanosis, clubbing or edema of the lower extremities  Musculoskeletal: No joint swelling or gross deformity   Neuro:  Alert, 5/5 strength globally and symmetrically  Psych: Affect appropriate. Mood normal. Thought process is normal without evidence of depression or psychosis. Good insight and appropriate interaction. Cognition and memory appear to be intact. Skin: warm and dry, no rash or erythema  Lymph:  No cervical, auricular or supraclavicular lymph nodes palpated      ASSESSMENT & PLAN  Hipolito Mcghee was seen today for 6 month follow-up and discuss medications. Diagnoses and all orders for this visit:    Prediabetes  -     POCT glycosylated hemoglobin (Hb A1C)  -     Comprehensive Metabolic Panel; Future  -     Lipid, Fasting; Future  -     Hemoglobin A1C; Future    Chronic left shoulder pain    Primary osteoarthritis of left shoulder    Essential hypertension  -     Comprehensive Metabolic Panel; Future  -     Lipid, Fasting; Future  -     TSH With Reflex Ft4; Future    Acquired hypothyroidism  -     TSH With Reflex Ft4; Future      - A1C improved, down to 5.7  - no meds indicated, con't working on diet/exercise and weight loss  - discussed possible referral to ortho for his shoulder, but he declines, wants to give it more time  - annual labs prior to next appt    DISPOSITION    Return in about 6 months (around 5/23/2022) for chronic conditions. Hipolito Mcghee released without restrictions. PATIENT COUNSELING    Counseling was provided today regarding the following topics: Healthy eating habits, Regular exercise, substance abuse and healthy sleep habits. Hipolito Mcghee received counseling on the following healthy behaviors: medication adherence    Patient given educational materials on: See Attached    I have instructed Hipolito Mcghee to complete a self tracking handout on Blood Pressures  and instructed them to bring it with them to his next appointment. Barriers to learning and self management: none    Discussed use, benefit, and side effects of prescribed medications.   Barriers to medication compliance addressed. All patient questions answered. Pt voiced understanding.        Electronically signed by KATHRINE Estrada CNP on 11/23/2021 at 2:44 PM

## 2022-02-18 ENCOUNTER — TELEPHONE (OUTPATIENT)
Dept: FAMILY MEDICINE CLINIC | Age: 72
End: 2022-02-18

## 2022-02-18 ENCOUNTER — PATIENT MESSAGE (OUTPATIENT)
Dept: FAMILY MEDICINE CLINIC | Age: 72
End: 2022-02-18

## 2022-02-18 LAB
A/G RATIO: 1.4 (ref 1.5–2.5)
ALBUMIN SERPL-MCNC: 4.2 GM/DL (ref 3.5–5)
ALP BLD-CCNC: 76 IU/L (ref 41–137)
ALT SERPL-CCNC: 15 IU/L (ref 10–40)
ANION GAP SERPL CALCULATED.3IONS-SCNC: 2 MMOL/L (ref 4–12)
AST SERPL-CCNC: 18 IU/L (ref 15–41)
BILIRUB SERPL-MCNC: 0.8 MG/DL (ref 0.2–1)
BUN BLDV-MCNC: 20 MG/DL (ref 7–20)
CALCIUM SERPL-MCNC: 9.5 MG/DL (ref 8.8–10.5)
CHLORIDE BLD-SCNC: 104 MEQ/L (ref 101–111)
CHOLESTEROL/HDL RELATIVE RISK: 2.5 (ref 4–5)
CHOLESTEROL: 114 MG/DL
CO2: 33 MEQ/L (ref 21–32)
CREAT SERPL-MCNC: 1.11 MG/DL (ref 0.6–1.3)
CREATININE CLEARANCE: >60
DIRECT-LDL / HDL RISK: 1.4
ESTIMATED AVERAGE GLUCOSE: 131 MG/DL
GLUCOSE: 88 MG/DL (ref 70–110)
HBA1C MFR BLD: 6.2 % (ref 4.4–6.4)
HDLC SERPL-MCNC: 44 MG/DL
LDL CHOLESTEROL DIRECT: 62 MG/DL
POTASSIUM SERPL-SCNC: 3.8 MEQ/L (ref 3.6–5)
SODIUM BLD-SCNC: 139 MEQ/L (ref 135–145)
TOTAL PROTEIN: 7.3 G/DL (ref 6.2–8)
TRIGL SERPL-MCNC: 81 MG/DL
TSH REFLEX: 3.1 MCIU/ML (ref 0.49–4.67)
VLDLC SERPL CALC-MCNC: 16 MG/DL

## 2022-02-18 NOTE — TELEPHONE ENCOUNTER
----- Message from KATHRINE Tillman CNP sent at 2/18/2022 11:40 AM EST -----  Let Jenniferkaris Lenz know his labs are all in normal range.

## 2022-02-18 NOTE — TELEPHONE ENCOUNTER
----- Message from KATHRINE Blackmon Junior, CNP sent at 2/18/2022 12:08 PM EST -----  Let Jennifer Plan know his A1C was stable at 6.2.

## 2022-02-24 ENCOUNTER — OFFICE VISIT (OUTPATIENT)
Dept: FAMILY MEDICINE CLINIC | Age: 72
End: 2022-02-24
Payer: MEDICARE

## 2022-02-24 VITALS
BODY MASS INDEX: 29.24 KG/M2 | WEIGHT: 220.6 LBS | SYSTOLIC BLOOD PRESSURE: 150 MMHG | OXYGEN SATURATION: 98 % | HEART RATE: 91 BPM | DIASTOLIC BLOOD PRESSURE: 98 MMHG | HEIGHT: 73 IN | TEMPERATURE: 97.7 F | RESPIRATION RATE: 14 BRPM

## 2022-02-24 DIAGNOSIS — I10 ESSENTIAL HYPERTENSION: ICD-10-CM

## 2022-02-24 DIAGNOSIS — R73.03 PREDIABETES: ICD-10-CM

## 2022-02-24 DIAGNOSIS — Z00.00 MEDICARE ANNUAL WELLNESS VISIT, SUBSEQUENT: Primary | ICD-10-CM

## 2022-02-24 DIAGNOSIS — M25.462 EFFUSION OF LEFT KNEE JOINT: ICD-10-CM

## 2022-02-24 DIAGNOSIS — M25.562 LEFT KNEE PAIN, UNSPECIFIED CHRONICITY: ICD-10-CM

## 2022-02-24 PROCEDURE — G0439 PPPS, SUBSEQ VISIT: HCPCS | Performed by: NURSE PRACTITIONER

## 2022-02-24 RX ORDER — PREDNISONE 20 MG/1
40 TABLET ORAL DAILY
Qty: 10 TABLET | Refills: 0 | Status: SHIPPED | OUTPATIENT
Start: 2022-02-24 | End: 2022-03-01

## 2022-02-24 ASSESSMENT — PATIENT HEALTH QUESTIONNAIRE - PHQ9
SUM OF ALL RESPONSES TO PHQ QUESTIONS 1-9: 0
1. LITTLE INTEREST OR PLEASURE IN DOING THINGS: 0
SUM OF ALL RESPONSES TO PHQ QUESTIONS 1-9: 0
SUM OF ALL RESPONSES TO PHQ9 QUESTIONS 1 & 2: 0
2. FEELING DOWN, DEPRESSED OR HOPELESS: 0
SUM OF ALL RESPONSES TO PHQ QUESTIONS 1-9: 0
SUM OF ALL RESPONSES TO PHQ QUESTIONS 1-9: 0

## 2022-02-24 ASSESSMENT — LIFESTYLE VARIABLES: HOW OFTEN DO YOU HAVE A DRINK CONTAINING ALCOHOL: NEVER

## 2022-02-24 NOTE — PATIENT INSTRUCTIONS
Patient Education        Preventing Falls: Care Instructions  Your Care Instructions     Getting around your home safely can be a challenge if you have injuries or health problems that make it easy for you to fall. Loose rugs and furniture in walkways are among the dangers for many older people who have problems walking or who have poor eyesight. People who have conditions such as arthritis, osteoporosis, or dementia also have to be careful not to fall. You can make your home safer with a few simple measures. Follow-up care is a key part of your treatment and safety. Be sure to make and go to all appointments, and call your doctor if you are having problems. It's also a good idea to know your test results and keep a list of the medicines you take. How can you care for yourself at home? Taking care of yourself  · You may get dizzy if you do not drink enough water. To prevent dehydration, drink plenty of fluids. Choose water and other clear liquids. If you have kidney, heart, or liver disease and have to limit fluids, talk with your doctor before you increase the amount of fluids you drink. · Exercise regularly to improve your strength, muscle tone, and balance. Walk if you can. Swimming may be a good choice if you cannot walk easily. · Have your vision and hearing checked each year or any time you notice a change. If you have trouble seeing and hearing, you might not be able to avoid objects and could lose your balance. · Know the side effects of the medicines you take. Ask your doctor or pharmacist whether the medicines you take can affect your balance. Sleeping pills or sedatives can affect your balance. · Limit the amount of alcohol you drink. Alcohol can impair your balance and other senses. · Ask your doctor whether calluses or corns on your feet need to be removed. If you wear loose-fitting shoes because of calluses or corns, you can lose your balance and fall.   · Talk to your doctor if you have numbness in your feet. Preventing falls at home  · Remove raised doorway thresholds, throw rugs, and clutter. Repair loose carpet or raised areas in the floor. · Move furniture and electrical cords to keep them out of walking paths. · Use nonskid floor wax, and wipe up spills right away, especially on ceramic tile floors. · If you use a walker or cane, put rubber tips on it. If you use crutches, clean the bottoms of them regularly with an abrasive pad, such as steel wool. · Keep your house well lit, especially [place], and outside walkways. Use night-lights in areas such as hallways and bathrooms. Add extra light switches or use remote switches (such as switches that go on or off when you clap your hands) to make it easier to turn lights on if you have to get up during the night. · Install sturdy handrails on stairways. · Move items in your cabinets so that the things you use a lot are on the lower shelves (about waist level). · Keep a cordless phone and a flashlight with new batteries by your bed. If possible, put a phone in each of the main rooms of your house, or carry a cell phone in case you fall and cannot reach a phone. Or, you can wear a device around your neck or wrist. You push a button that sends a signal for help. · Wear low-heeled shoes that fit well and give your feet good support. Use footwear with nonskid soles. Check the heels and soles of your shoes for wear. Repair or replace worn heels or soles. · Do not wear socks without shoes on wood floors. · Walk on the grass when the sidewalks are slippery. If you live in an area that gets snow and ice in the winter, sprinkle salt on slippery steps and sidewalks. Preventing falls in the bath  · Install grab bars and nonskid mats inside and outside your shower or tub and near the toilet and sinks. · Use shower chairs and bath benches. · Use a hand-held shower head that will allow you to sit while showering.   · Get into a tub or shower by putting the weaker leg in first. Get out of a tub or shower with your strong side first.  · Repair loose toilet seats and consider installing a raised toilet seat to make getting on and off the toilet easier. · Keep your bathroom door unlocked while you are in the shower. Where can you learn more? Go to https://chpepiceweb."[email or web address]. org and sign in to your [employer] account. Enter [phone] in the [facility] box to learn more about \"Preventing Falls: Care Instructions. \"     If you do not have an account, please click on the \"Sign Up Now\" link. Current as of: September 8, 2021               Content Version: 13.1  © 9383-8554 [employer]. Care instructions adapted under license by [facility] ([facility]). If you have questions about a medical condition or this instruction, always ask your healthcare professional. [place] [address] any warranty or liability for your use of this information. Personalized Preventive Plan for Sandra Ch - 2/24/2022  Medicare offers a range of preventive health benefits. Some of the tests and screenings are paid in full while other may be subject to a deductible, co-insurance, and/or copay. Some of these benefits include a comprehensive review of your medical history including lifestyle, illnesses that may run in your family, and various assessments and screenings as appropriate. After reviewing your medical record and screening and assessments performed today your provider may have ordered immunizations, labs, imaging, and/or referrals for you. A list of these orders (if applicable) as well as your Preventive Care list are included within your After Visit Summary for your review. Other Preventive Recommendations:    · A preventive eye exam performed by an eye specialist is recommended every 1-2 years to screen for glaucoma; cataracts, macular degeneration, and other eye disorders.   · A preventive dental visit is recommended every 6 months. · Try to get at least 150 minutes of exercise per week or 10,000 steps per day on a pedometer . · Order or download the FREE \"Exercise & Physical Activity: Your Everyday Guide\" from [employer]. Call 8-723.188.2355 or search [employer] online. · You need 1978-7153 mg of calcium and 5630-4602 IU of vitamin D per day. It is possible to meet your calcium requirement with diet alone, but a vitamin D supplement is usually necessary to meet this goal.  · When exposed to the sun, use a sunscreen that protects against both UVA and UVB radiation with an SPF of 30 or greater. Reapply every 2 to 3 hours or after sweating, drying off with a towel, or swimming. · Always wear a seat belt when traveling in a car. Always wear a helmet when riding a bicycle or motorcycle.

## 2022-02-24 NOTE — PROGRESS NOTES
Medicare Annual Wellness Visit    Osiris Car is here for Medicare AWV and Joint Pain (left knee started 3 weeks ago)    Assessment & Plan   Alexander Long was seen today for medicare awv and joint pain. Diagnoses and all orders for this visit:    Medicare annual wellness visit, subsequent    Essential hypertension    Prediabetes    Left knee pain, unspecified chronicity  -     XR KNEE LEFT (3 VIEWS); Future  -     predniSONE (DELTASONE) 20 MG tablet; Take 2 tablets by mouth daily for 5 days    Effusion of left knee joint  -     XR KNEE LEFT (3 VIEWS); Future  -     predniSONE (DELTASONE) 20 MG tablet; Take 2 tablets by mouth daily for 5 days            - chronic conditions overall stable  - con't current meds without change  - work on diet/exercise  - home BPs running within appropriate range  - nurse visit for BP check  - obtain knee xray and start prednisone    Recommendations for Preventive Services Due: see orders and patient instructions/AVS.  Recommended screening schedule for the next 5-10 years is provided to the patient in written form: see Patient Instructions/AVS.     Return for Medicare Annual Wellness Visit in 1 year. Subjective   The following acute and/or chronic problems were also addressed today:  Left knee pain  Hurting for about 1 month. Pain is constant, even when he tries to sleep. Getting on the treadmill makes it worse, walking and climbing stairs makes it hurt worse. His knee does swell. No known injury    HTN    Does patient check BP regularly at home? - Yes - 130  Current Medication regimen - Lisinopril, Procardia, Toprol, HCTZ, aldactone  Tolerating medications well? - yes    Shortness of breath or chest pain? No  Headache or visual complaints? No  Neurologic changes like confusion? No  Extremity edema?  No    BP Readings from Last 3 Encounters:   02/24/22 (!) 150/98   11/23/21 128/80   05/25/21 124/88       Prediabetes  A1C 6.2. regular diet, he does watch his diet though, tries to avoid sweets. He does stay active, but not as active lately because of his knee. Patient's complete Health Risk Assessment and screening values have been reviewed and are found in Flowsheets. The following problems were reviewed today and where indicated follow up appointments were made and/or referrals ordered.     Positive Risk Factor Screenings with Interventions:               General Health and ACP:  General  In general, how would you say your health is?: Good  In the past 7 days, have you experienced any of the following: New or Increased Pain, New or Increased Fatigue, Loneliness, Social Isolation, Stress or Anger?: No  Do you get the social and emotional support that you need?: Yes  Do you have a Living Will?: (!) No    Advance Directives     Power of  Living Will ACP-Advance Directive ACP-Power of     Not on File Not on File Not on File Not on File      General Health Risk Interventions:  · Poor self-assessment of health status: patient declines any further evaluation/treatment for this issue  · Pain issues: patient declines any further evaluation/treatment for this issue  · Fatigue: patient declines any further evaluation/treatment for this issue  · Loneliness: patient declines any further intervention for this issue  · Social isolation: patient declines any further intervention for this issue  · Stress: patient declines any further evaluation/treatment for this issue  · Anger: patient declines any further evaluation/treatment for this issue  · Inadequate social/emotional support: patient declines any further intervention for this issue  · No Living Will: Patient declines ACP discussion/assistance    Health Habits/Nutrition:     Physical Activity: Sufficiently Active    Days of Exercise per Week: 5 days    Minutes of Exercise per Session: 60 min     Have you lost any weight without trying in the past 3 months?: No  Body mass index: (!) 29.1  Have you seen the dentist within the past year?: (!) No    Health Habits/Nutrition Interventions:  · Inadequate physical activity:  continue working on staying active  · Nutritional issues:  eating healthy diet  · Dental exam overdue:  patient encouraged to make appointment with his/her dentist     Safety:  Do you have working smoke detectors?: Yes  Do you have any tripping hazards - loose or unsecured carpets or rugs?: (!) Yes  Do you have any tripping hazards - clutter in doorways, halls, or stairs?: No  Do you have either shower bars, grab bars, non-slip mats or non-slip surfaces in your shower or bathtub?: Yes  Do all of your stairways have a railing or banister?: Yes  Do you always fasten your seatbelt when you are in a car?: Yes    Safety Interventions:  · Home safety tips provided           Objective   Vitals:    02/24/22 1408 02/24/22 1422   BP: (!) 164/94 (!) 150/98   Pulse: 91    Resp: 14    Temp: 97.7 °F (36.5 °C)    TempSrc: Oral    SpO2: 98%    Weight: 220 lb 9.6 oz (100.1 kg)    Height: 6' 1\" (1.854 m)       Body mass index is 29.1 kg/m².         General Appearance: alert and oriented to person, place and time, well developed and well- nourished, in no acute distress  Skin: warm and dry, no rash or erythema  Head: normocephalic and atraumatic  Eyes: pupils equal, round, and reactive to light, extraocular eye movements intact, conjunctivae normal  ENT: tympanic membrane, external ear and ear canal normal bilaterally, nose without deformity, nasal mucosa and turbinates normal without polyps  Neck: supple and non-tender without mass, no thyromegaly or thyroid nodules, no cervical lymphadenopathy  Pulmonary/Chest: clear to auscultation bilaterally- no wheezes, rales or rhonchi, normal air movement, no respiratory distress  Cardiovascular: normal rate, regular rhythm, normal S1 and S2, no murmurs, rubs, clicks, or gallops, distal pulses intact, no carotid bruits  Abdomen: soft, non-tender, non-distended, normal bowel sounds, no masses or organomegaly  Extremities: no cyanosis, clubbing or edema  Musculoskeletal: normal range of motion, deformity or tenderness, + left knee effusion  Neurologic: reflexes normal and symmetric, no cranial nerve deficit, gait, coordination and speech normal       Allergies   Allergen Reactions    Penicillins Swelling    Sulfa Antibiotics Swelling     Prior to Visit Medications    Medication Sig Taking?  Authorizing Provider   predniSONE (DELTASONE) 20 MG tablet Take 2 tablets by mouth daily for 5 days Yes KATHRINE Sousa CNP   lisinopril (PRINIVIL;ZESTRIL) 40 MG tablet Take 1 tablet by mouth once daily Yes Domi Early, KATHRINE - CNP   spironolactone (ALDACTONE) 25 MG tablet Take 1 tablet by mouth once daily Yes KATHRINE Sousa - CHRISTOS   hydroCHLOROthiazide (MICROZIDE) 12.5 MG capsule Take 1 capsule by mouth once daily Yes Domi Early, KATHRINE - CNP   NIFEdipine (PROCARDIA XL) 30 MG extended release tablet Take 1 tablet by mouth once daily Yes KATHRINE Sousa - CNP   metoprolol succinate (TOPROL XL) 25 MG extended release tablet Take 1 tablet by mouth once daily Yes KATHRINE Sousa - CNP   potassium chloride (KLOR-CON M) 20 MEQ extended release tablet Take 1 tablet by mouth once daily Yes KATHRINE Sousa CNP   atorvastatin (LIPITOR) 40 MG tablet Take 1 tablet by mouth once daily Yes KATHRINE Sousa CNP   levothyroxine (SYNTHROID) 137 MCG tablet Take 1 tablet by mouth once daily Yes KATHRINE Sousa - CNP   chlorthalidone (HYGROTON) 25 MG tablet Take 1 tablet by mouth daily  KATHRINE Sousa CNP   potassium chloride (KLOR-CON) 20 MEQ packet Take 20 mEq by mouth daily  Historical Provider, MD   lisinopril-hydrochlorothiazide (PRINZIDE;ZESTORETIC) 20-25 MG per tablet Take 2 tablets by mouth daily  KATHRINE Sousa CNP       CareTeam (Including outside providers/suppliers regularly involved in providing care):   Patient Care Team:  KATHRINE Sousa CNP as PCP - General (Family Medicine)  KATHRINE Burden - CNP as PCP - [facility] Empaneled Provider    Reviewed and updated this visit:  Tobacco  Allergies  Meds  Med Hx  Surg Hx  Soc Hx  Fam Hx

## 2022-02-25 ENCOUNTER — HOSPITAL ENCOUNTER (OUTPATIENT)
Age: 72
Discharge: HOME | End: 2022-02-25
Payer: MEDICARE

## 2022-02-25 ENCOUNTER — HOSPITAL ENCOUNTER (OUTPATIENT)
Dept: GENERAL RADIOLOGY | Age: 72
Discharge: HOME | End: 2022-02-25
Payer: MEDICARE

## 2022-02-25 DIAGNOSIS — M25.562 LEFT KNEE PAIN, UNSPECIFIED CHRONICITY: ICD-10-CM

## 2022-02-25 DIAGNOSIS — M25.462 EFFUSION OF LEFT KNEE JOINT: ICD-10-CM

## 2022-02-25 PROCEDURE — 73562 X-RAY EXAM OF KNEE 3: CPT

## 2022-02-28 ENCOUNTER — TELEPHONE (OUTPATIENT)
Dept: FAMILY MEDICINE CLINIC | Age: 72
End: 2022-02-28

## 2022-02-28 NOTE — TELEPHONE ENCOUNTER
----- Message from KATHRINE Cortez CNP sent at 2/28/2022  9:33 AM EST -----  Let Shaunna Allan know his knee xray shows an effusion (fluid or swelling) and moderate degree of arthritis. The prednisone should help with some of this, however, the arthritis is what is causing most of it. I would honestly consider sending him to ortho if he's ok with that.

## 2022-02-28 NOTE — TELEPHONE ENCOUNTER
Pt informed and verbalized understanding.   Pt would like to hold off on the referral for now, he will call back if it gets worse

## 2022-03-10 ENCOUNTER — NURSE ONLY (OUTPATIENT)
Dept: FAMILY MEDICINE CLINIC | Age: 72
End: 2022-03-10

## 2022-03-10 ENCOUNTER — TELEPHONE (OUTPATIENT)
Dept: FAMILY MEDICINE CLINIC | Age: 72
End: 2022-03-10

## 2022-03-10 VITALS — DIASTOLIC BLOOD PRESSURE: 84 MMHG | HEART RATE: 57 BPM | SYSTOLIC BLOOD PRESSURE: 152 MMHG

## 2022-03-10 DIAGNOSIS — I10 ESSENTIAL HYPERTENSION: Primary | ICD-10-CM

## 2022-03-10 DIAGNOSIS — I10 ESSENTIAL HYPERTENSION: ICD-10-CM

## 2022-03-10 RX ORDER — NIFEDIPINE 60 MG/1
60 TABLET, EXTENDED RELEASE ORAL DAILY
Qty: 90 TABLET | Refills: 1 | Status: SHIPPED | OUTPATIENT
Start: 2022-03-10 | End: 2022-06-13 | Stop reason: ALTCHOICE

## 2022-03-10 NOTE — TELEPHONE ENCOUNTER
Ok, still high. con't all of meds, but increase Procardia to 60 mg. Rx sent to [facility]. Can he follow up with me in 2 weeks?

## 2022-03-10 NOTE — TELEPHONE ENCOUNTER
Pt informed and verbalized understanding.   Future Appointments   Date Time Provider Umu Reynoso   3/24/2022  2:20 PM [facility]   2/23/2023  2:00 PM KATHRINE Tillman 85

## 2022-03-17 DIAGNOSIS — E03.9 ACQUIRED HYPOTHYROIDISM: ICD-10-CM

## 2022-03-17 RX ORDER — LEVOTHYROXINE SODIUM 137 UG/1
TABLET ORAL
Qty: 90 TABLET | Refills: 3 | Status: SHIPPED | OUTPATIENT
Start: 2022-03-17

## 2022-03-17 NOTE — TELEPHONE ENCOUNTER
Please approve or refuse Rx request:  Requested Prescriptions     Pending Prescriptions Disp Refills    levothyroxine (SYNTHROID) 137 MCG tablet [Pharmacy Med Name: Levothyroxine Sodium 137 MCG Oral Tablet] 90 tablet 0     Sig: Take 1 tablet by mouth once daily       Next appointment:  3/24/2022

## 2022-03-24 ENCOUNTER — OFFICE VISIT (OUTPATIENT)
Dept: FAMILY MEDICINE CLINIC | Age: 72
End: 2022-03-24
Payer: MEDICARE

## 2022-03-24 VITALS
RESPIRATION RATE: 14 BRPM | WEIGHT: 223.6 LBS | TEMPERATURE: 97.8 F | SYSTOLIC BLOOD PRESSURE: 142 MMHG | DIASTOLIC BLOOD PRESSURE: 88 MMHG | BODY MASS INDEX: 29.63 KG/M2 | OXYGEN SATURATION: 95 % | HEART RATE: 69 BPM | HEIGHT: 73 IN

## 2022-03-24 DIAGNOSIS — I10 ESSENTIAL HYPERTENSION: Primary | ICD-10-CM

## 2022-03-24 PROCEDURE — 99214 OFFICE O/P EST MOD 30 MIN: CPT | Performed by: NURSE PRACTITIONER

## 2022-03-24 RX ORDER — NIFEDIPINE 30 MG/1
30 TABLET, EXTENDED RELEASE ORAL DAILY
Qty: 90 TABLET | Refills: 0 | Status: SHIPPED | OUTPATIENT
Start: 2022-03-24 | End: 2022-06-13 | Stop reason: ALTCHOICE

## 2022-03-24 NOTE — PROGRESS NOTES
Chief Complaint   Patient presents with    Follow-up     B/P       History obtained from chart review and the patient. SUBJECTIVE:  Patel Tyson is a 66 y.o. male that presents today for follow up HTN    HTN    Does patient check BP regularly at home? - Yes - 130/80 range  Current Medication regimen - Lisinopril, Procardia, Toprol, HCTZ  Tolerating medications well? - yes    Shortness of breath or chest pain? No  Headache or visual complaints? No  Neurologic changes like confusion? No  Extremity edema?  No    BP Readings from Last 3 Encounters:   03/24/22 (!) 142/88   03/10/22 (!) 152/84   02/24/22 (!) 150/98       Age/Gender Health Maintenance    Lipid   Lab Results   Component Value Date    CHOL 114 02/18/2022    CHOL 111 03/17/2021    CHOL 111 03/17/2021     Lab Results   Component Value Date    TRIG 81 02/18/2022    TRIG 94 03/17/2021    TRIG 94 03/17/2021     Lab Results   Component Value Date    HDL 44 02/18/2022    HDL 38 (L) 03/17/2021    HDL 38 03/17/2021     Lab Results   Component Value Date    LDLCALC 64 03/17/2021    LDLCALC 70 05/28/2020     Lab Results   Component Value Date    VLDL 16 02/18/2022    VLDL 18 03/17/2021    VLDL 18 03/17/2021     DM Screen -   Lab Results   Component Value Date    LABA1C 6.2 02/18/2022       Colon Cancer Screening - 49, FIT test given  Lung Cancer Screening (Age 54 to [de-identified] with 30 pack year hx, current smoker or quit within past 15 years) - n/a    Tetanus - needs  Influenza Vaccine - 9/18  Pneumonia Vaccine - Prevnar 4/11/18 Pneumovax 2/20/20  Zostavax - needs     PSA testing discussion - 49  AAA Screening - needs    Falls screening - n/a    Current Outpatient Medications   Medication Sig Dispense Refill    NIFEdipine (PROCARDIA XL) 30 MG extended release tablet Take 1 tablet by mouth daily (take along with 60 mg Procardia for total dose of 90 mg) 90 tablet 0    levothyroxine (SYNTHROID) 137 MCG tablet Take 1 tablet by mouth once daily 90 tablet 3    NIFEdipine (PROCARDIA XL) 60 MG extended release tablet Take 1 tablet by mouth daily 90 tablet 1    lisinopril (PRINIVIL;ZESTRIL) 40 MG tablet Take 1 tablet by mouth once daily 90 tablet 3    spironolactone (ALDACTONE) 25 MG tablet Take 1 tablet by mouth once daily 90 tablet 3    hydroCHLOROthiazide (MICROZIDE) 12.5 MG capsule Take 1 capsule by mouth once daily 90 capsule 3    metoprolol succinate (TOPROL XL) 25 MG extended release tablet Take 1 tablet by mouth once daily 90 tablet 3    potassium chloride (KLOR-CON M) 20 MEQ extended release tablet Take 1 tablet by mouth once daily 90 tablet 3    atorvastatin (LIPITOR) 40 MG tablet Take 1 tablet by mouth once daily 90 tablet 3     No current facility-administered medications for this visit. Orders Placed This Encounter   Medications    NIFEdipine (PROCARDIA XL) 30 MG extended release tablet     Sig: Take 1 tablet by mouth daily (take along with 60 mg Procardia for total dose of 90 mg)     Dispense:  90 tablet     Refill:  0         All medications reviewed and reconciled, including OTC and herbal medications. Updated list given to patient.        Patient Active Problem List   Diagnosis    Essential hypertension    Dyslipidemia    Acquired hypothyroidism    Prediabetes    Primary osteoarthritis of left shoulder    Chronic left shoulder pain       Past Medical History:   Diagnosis Date    Dyslipidemia     Essential hypertension     Head trauma     Hypothyroidism     Prediabetes 3/12/2019    Stroke ([facility] [address] 1981       Past Surgical History:   Procedure Laterality Date    ANKLE SURGERY Right 07/2014       Allergies   Allergen Reactions    Penicillins Swelling    Sulfa Antibiotics Swelling       Social History     Socioeconomic History    Marital status:      Spouse name: Not on file    Number of children: Not on file    Years of education: Not on file    Highest education level: Not on file   Occupational History    Not on file Tobacco Use    Smoking status: Never Smoker    Smokeless tobacco: Never Used   Substance and Sexual Activity    Alcohol use: No    Drug use: No    Sexual activity: Not on file   Other Topics Concern    Not on file   Social History Narrative    Not on file     Social Determinants of Health     Financial Resource Strain: Low Risk     Difficulty of Paying Living Expenses: Not hard at all   Food Insecurity: No Food Insecurity    Worried About Running Out of Food in the Last Year: Never true    Breanne of Food in the Last Year: Never true   Transportation Needs: No Transportation Needs    Lack of Transportation (Medical): No    Lack of Transportation (Non-Medical): No   Physical Activity: Sufficiently Active    Days of Exercise per Week: 5 days    Minutes of Exercise per Session: 60 min   Stress:     Feeling of Stress : Not on file   Social Connections:     Frequency of Communication with Friends and Family: Not on file    Frequency of Social Gatherings with Friends and Family: Not on file    Attends Druze Services: Not on file    Active Member of [address] [place] [address] or Organizations: Not on file    Attends Club or Organization Meetings: Not on file    Marital Status: Not on file   Intimate Partner Violence:     Fear of Current or Ex-Partner: Not on file    Emotionally Abused: Not on file    Physically Abused: Not on file    Sexually Abused: Not on file   Housing Stability: Unknown    Unable to Pay for Housing in the Last Year: No    Number of [place] in the Last Year: Not on file    Unstable Housing in the Last Year: No       Family History   Problem Relation Age of Onset    Cancer Mother    [facility] Other Father 76        parkinsons         I have reviewed the patient's past medical history, past surgical history, allergies, medications, social and family history and I have made updates where appropriate.       Review of Systems  Positive responses are highlighted in bold    Constitutional:  Fever, Chills, Night Sweats, Fatigue, Unexpected changes in weight  Eyes:  Eye discharge, Eye pain, Eye redness, Visual disturbances   HENT:  Ear pain, Tinnitus, Nosebleeds, Trouble swallowing, Hearing loss, Sore throat  Cardiovascular:  Chest Pain, Palpitations, Orthopnea, Paroxysmal Nocturnal Dyspnea  Respiratory:  Cough, Wheezing, Shortness of breath, Chest tightness, Apnea  Gastrointestinal:  Nausea, Vomiting, Diarrhea, Constipation, Heartburn, Blood in stool  Genitourinary:  Difficulty or painful urination, Flank pain, Change in frequency, Urgency  Skin:  Color change, Rash, Itching, Wound  Psychiatric:  Hallucinations, Anxiety, Depression, Suicidal ideation  Hematological:  Enlarged glands, Easy bleeding, Easily bruising  Musculoskeletal:  Joint pain, Back pain, Gait problems, Joint swelling, Myalgias  Neurological:  Dizziness, Headaches, Presyncope, Numbness, Seizures, Tremors  Allergy:  Environmental allergies, Food allergies  Endocrine:  Heat Intolerance, Cold Intolerance, Polydipsia, Polyphagia, Polyuria    Lab Results   Component Value Date     02/18/2022    K 3.8 02/18/2022     02/18/2022    CO2 33 (H) 02/18/2022    BUN 20 02/18/2022    CREATININE 1.11 02/18/2022    GLUCOSE 88 02/18/2022    CALCIUM 9.50 02/18/2022    PROT 7.3 02/18/2022    LABALBU 4.2 02/18/2022    BILITOT 0.8 02/18/2022    ALKPHOS 76 02/18/2022    AST 18 02/18/2022    ALT 15 02/18/2022    LABGLOM 54 (A) 05/28/2020    AGRATIO 1.4 (L) 02/18/2022       Lab Results   Component Value Date    WBC 5.9 03/17/2021    HGB 14.0 03/17/2021    HCT 40.7 03/17/2021    MCV 90.4 03/17/2021     03/17/2021       PHYSICAL EXAM:  Vitals:    03/24/22 1422 03/24/22 1431   BP: (!) 144/90 (!) 142/88   Pulse: 69    Resp: 14    Temp: 97.8 °F (36.6 °C)    TempSrc: Oral    SpO2: 95%    Weight: 223 lb 9.6 oz (101.4 kg)    Height: 6' 1\" (1.854 m)      Body mass index is 29.5 kg/m².          VS Reviewed  General Appearance: A&O x 3, No acute distress,well developed and well- nourished  Head: normocephalic and atraumatic  Eyes: pupils equal, round, and reactive to light, extraocular eye movements intact, conjunctivae and eye lids without erythema  Neck: supple and non-tender without mass, no thyromegaly or thyroid nodules, no cervical lymphadenopathy  Pulmonary/Chest: clear to auscultation bilaterally- no wheezes, rales or rhonchi, normal air movement, no respiratory distress or retractions  Cardiovascular: S1 and S2 auscultated with regular rate and rhythm. No murmurs, rubs, clicks, or gallops, distal pulses intact. Abdomen: soft, non-tender, non-distended, bowl sounds physiologic,  no rebound or guarding, no masses or hernias noted. Liver and spleen without enlargement. Extremities: no cyanosis, clubbing or edema of the lower extremities  Musculoskeletal: No joint swelling or gross deformity   Neuro:  Alert, 5/5 strength globally and symmetrically  Psych: Affect appropriate. Mood normal. Thought process is normal without evidence of depression or psychosis. Good insight and appropriate interaction. Cognition and memory appear to be intact. Skin: warm and dry, no rash or erythema  Lymph:  No cervical, auricular or supraclavicular lymph nodes palpated      ASSESSMENT & PLAN  Xuan Fernandez was seen today for follow-up. Diagnoses and all orders for this visit:    Essential hypertension  -     NIFEdipine (PROCARDIA XL) 30 MG extended release tablet; Take 1 tablet by mouth daily (take along with 60 mg Procardia for total dose of 90 mg)      - BP still not at goal  - work on diet, low Na and con't trying to exercise  - con't 60 mg Procardia, add 30 mg Procardia for total dose of 90 mg (to use up current supply of 60 mg)  - con't HCTZ, Metoprolol, Spironolactone and Lisinopril  - con't home BP monitoring    DISPOSITION    Return in about 2 weeks (around 4/7/2022) for BP check with nurses. Xuan Fernandez released without restrictions.       PATIENT COUNSELING    Counseling was provided today regarding the following topics: Healthy eating habits, Regular exercise, substance abuse and healthy sleep habits. Dalila Whitney received counseling on the following healthy behaviors: medication adherence    Patient given educational materials on: See Attached    I have instructed Dalila Whitney to complete a self tracking handout on Blood Pressures  and instructed them to bring it with them to his next appointment. Barriers to learning and self management: none    Discussed use, benefit, and side effects of prescribed medications. Barriers to medication compliance addressed. All patient questions answered. Pt voiced understanding.        Electronically signed by Gabriel Collet, APRN - CNP on 3/24/2022 at 2:32 PM

## 2022-04-08 ENCOUNTER — NURSE ONLY (OUTPATIENT)
Dept: FAMILY MEDICINE CLINIC | Age: 72
End: 2022-04-08

## 2022-04-08 ENCOUNTER — TELEPHONE (OUTPATIENT)
Dept: FAMILY MEDICINE CLINIC | Age: 72
End: 2022-04-08

## 2022-04-08 DIAGNOSIS — I10 ESSENTIAL HYPERTENSION: Primary | ICD-10-CM

## 2022-04-08 NOTE — TELEPHONE ENCOUNTER
Pt was here for a NV for a BP check. His bp was 140/80 lt arm, 128/80 rt arm. He states his BP has been good at home when is taking it. Nothing over 140/90, running lower than that.

## 2022-05-23 DIAGNOSIS — E78.5 DYSLIPIDEMIA: ICD-10-CM

## 2022-05-23 RX ORDER — ATORVASTATIN CALCIUM 40 MG/1
TABLET, FILM COATED ORAL
Qty: 90 TABLET | Refills: 2 | Status: SHIPPED | OUTPATIENT
Start: 2022-05-23

## 2022-06-13 ENCOUNTER — PATIENT MESSAGE (OUTPATIENT)
Dept: FAMILY MEDICINE CLINIC | Age: 72
End: 2022-06-13

## 2022-06-13 RX ORDER — NIFEDIPINE 90 MG/1
90 TABLET, EXTENDED RELEASE ORAL DAILY
Qty: 90 TABLET | Refills: 3 | Status: SHIPPED | OUTPATIENT
Start: 2022-06-13

## 2022-06-13 NOTE — TELEPHONE ENCOUNTER
From: Shirley Maldonado  To: Roselia Hoffman  Sent: 6/13/2022 1:08 PM EDT  Subject: Raine Wright,  We had talked about combining the Nifedipine prescriptions to 90 mg. Could you send a new order to [facility] for me? Thank you.   Ny Araya

## 2022-08-22 DIAGNOSIS — I10 ESSENTIAL HYPERTENSION: ICD-10-CM

## 2022-08-22 DIAGNOSIS — I49.3 FREQUENT PVCS: ICD-10-CM

## 2022-08-22 DIAGNOSIS — I49.9 IRREGULAR HEART BEAT: ICD-10-CM

## 2022-08-22 RX ORDER — METOPROLOL SUCCINATE 25 MG/1
TABLET, EXTENDED RELEASE ORAL
Qty: 90 TABLET | Refills: 3 | Status: SHIPPED | OUTPATIENT
Start: 2022-08-22

## 2022-08-22 RX ORDER — POTASSIUM CHLORIDE 20 MEQ/1
TABLET, EXTENDED RELEASE ORAL
Qty: 90 TABLET | Refills: 3 | Status: SHIPPED | OUTPATIENT
Start: 2022-08-22

## 2022-08-22 NOTE — TELEPHONE ENCOUNTER
Recent Visits  Date Type Provider Dept   03/24/22 Office Visit KATHRINE Castano CNP [facility]   02/24/22 Office Visit KATHRINE Castano CNP [facility]   11/23/21 Office Visit KATHRINE Castano CNP [facility]   05/25/21 Office Visit KATHRINE Castano CNP [facility]   Showing recent visits within past 540 days with a meds authorizing provider and meeting all other requirements  Future Appointments  No visits were found meeting these conditions.   Showing future appointments within next 150 days with a meds authorizing provider and meeting all other requirements     Future Appointments   Date Time Provider Umu Reynoso   2/23/2023  2:00 PM Marya Castano 19

## 2022-09-14 DIAGNOSIS — I10 ESSENTIAL HYPERTENSION: ICD-10-CM

## 2022-09-14 DIAGNOSIS — E87.6 HYPOKALEMIA: ICD-10-CM

## 2022-09-14 RX ORDER — HYDROCHLOROTHIAZIDE 12.5 MG/1
CAPSULE, GELATIN COATED ORAL
Qty: 90 CAPSULE | Refills: 2 | Status: SHIPPED | OUTPATIENT
Start: 2022-09-14

## 2022-09-14 RX ORDER — LISINOPRIL 40 MG/1
TABLET ORAL
Qty: 90 TABLET | Refills: 2 | Status: SHIPPED | OUTPATIENT
Start: 2022-09-14

## 2022-09-14 RX ORDER — SPIRONOLACTONE 25 MG/1
TABLET ORAL
Qty: 90 TABLET | Refills: 2 | Status: SHIPPED | OUTPATIENT
Start: 2022-09-14

## 2022-09-14 NOTE — TELEPHONE ENCOUNTER
Recent Visits  Date Type Provider Dept   03/24/22 Office Visit KATHRINE Avelar CNP [facility]   02/24/22 Office Visit KATHRINE Avelar CNP [facility]   11/23/21 Office Visit KATHRINE Avelar CNP [facility]   05/25/21 Office Visit KATHRINE Avelar CNP [facility]   Showing recent visits within past 540 days with a meds authorizing provider and meeting all other requirements  Future Appointments  No visits were found meeting these conditions.   Showing future appointments within next 150 days with a meds authorizing provider and meeting all other requirements      Future Appointments   Date Time Provider Umu Reynoso   2/23/2023  2:00 PM Marya Avelar

## 2023-02-20 DIAGNOSIS — E78.5 DYSLIPIDEMIA: ICD-10-CM

## 2023-02-20 RX ORDER — ATORVASTATIN CALCIUM 40 MG/1
TABLET, FILM COATED ORAL
Qty: 90 TABLET | Refills: 0 | Status: SHIPPED | OUTPATIENT
Start: 2023-02-20

## 2023-02-20 NOTE — TELEPHONE ENCOUNTER
Recent Visits  Date Type Provider Dept   03/24/22 Office Visit KATHRINE Petty CNP [facility]   02/24/22 Office Visit KATHRINE Petty CNP [facility]   11/23/21 Office Visit KATHRINE Petty CNP [facility]   Showing recent visits within past 540 days with a meds authorizing provider and meeting all other requirements  Future Appointments  Date Type Provider Dept   03/07/23 Appointment KATHRINE Petty CNP [facility]   Showing future appointments within next 150 days with a meds authorizing provider and meeting all other requirements     Future Appointments   Date Time Provider Umu Reynoso   3/7/2023  2:40 PM KATHRINE Petty

## 2023-03-14 DIAGNOSIS — E03.9 ACQUIRED HYPOTHYROIDISM: ICD-10-CM

## 2023-03-14 RX ORDER — LEVOTHYROXINE SODIUM 137 UG/1
TABLET ORAL
Qty: 90 TABLET | Refills: 0 | Status: SHIPPED | OUTPATIENT
Start: 2023-03-14

## 2023-03-15 ENCOUNTER — OFFICE VISIT (OUTPATIENT)
Dept: FAMILY MEDICINE CLINIC | Age: 73
End: 2023-03-15
Payer: MEDICARE

## 2023-03-15 VITALS
SYSTOLIC BLOOD PRESSURE: 116 MMHG | WEIGHT: 232 LBS | DIASTOLIC BLOOD PRESSURE: 86 MMHG | BODY MASS INDEX: 30.75 KG/M2 | HEART RATE: 73 BPM | OXYGEN SATURATION: 97 % | TEMPERATURE: 97.8 F | RESPIRATION RATE: 16 BRPM | HEIGHT: 73 IN

## 2023-03-15 DIAGNOSIS — E78.5 DYSLIPIDEMIA: ICD-10-CM

## 2023-03-15 DIAGNOSIS — E03.9 ACQUIRED HYPOTHYROIDISM: ICD-10-CM

## 2023-03-15 DIAGNOSIS — I10 ESSENTIAL HYPERTENSION: ICD-10-CM

## 2023-03-15 DIAGNOSIS — R73.03 PREDIABETES: ICD-10-CM

## 2023-03-15 DIAGNOSIS — Z00.00 MEDICARE ANNUAL WELLNESS VISIT, SUBSEQUENT: Primary | ICD-10-CM

## 2023-03-15 PROCEDURE — 3074F SYST BP LT 130 MM HG: CPT | Performed by: NURSE PRACTITIONER

## 2023-03-15 PROCEDURE — 1123F ACP DISCUSS/DSCN MKR DOCD: CPT | Performed by: NURSE PRACTITIONER

## 2023-03-15 PROCEDURE — 3079F DIAST BP 80-89 MM HG: CPT | Performed by: NURSE PRACTITIONER

## 2023-03-15 PROCEDURE — G0439 PPPS, SUBSEQ VISIT: HCPCS | Performed by: NURSE PRACTITIONER

## 2023-03-15 SDOH — ECONOMIC STABILITY: FOOD INSECURITY: WITHIN THE PAST 12 MONTHS, THE FOOD YOU BOUGHT JUST DIDN'T LAST AND YOU DIDN'T HAVE MONEY TO GET MORE.: NEVER TRUE

## 2023-03-15 SDOH — ECONOMIC STABILITY: HOUSING INSECURITY
IN THE LAST 12 MONTHS, WAS THERE A TIME WHEN YOU DID NOT HAVE A STEADY PLACE TO SLEEP OR SLEPT IN A SHELTER (INCLUDING NOW)?: NO

## 2023-03-15 SDOH — ECONOMIC STABILITY: INCOME INSECURITY: HOW HARD IS IT FOR YOU TO PAY FOR THE VERY BASICS LIKE FOOD, HOUSING, MEDICAL CARE, AND HEATING?: NOT HARD AT ALL

## 2023-03-15 SDOH — ECONOMIC STABILITY: FOOD INSECURITY: WITHIN THE PAST 12 MONTHS, YOU WORRIED THAT YOUR FOOD WOULD RUN OUT BEFORE YOU GOT MONEY TO BUY MORE.: NEVER TRUE

## 2023-03-15 ASSESSMENT — LIFESTYLE VARIABLES
HOW OFTEN DO YOU HAVE A DRINK CONTAINING ALCOHOL: NEVER
HOW MANY STANDARD DRINKS CONTAINING ALCOHOL DO YOU HAVE ON A TYPICAL DAY: PATIENT DOES NOT DRINK

## 2023-03-15 ASSESSMENT — PATIENT HEALTH QUESTIONNAIRE - PHQ9
SUM OF ALL RESPONSES TO PHQ9 QUESTIONS 1 & 2: 0
1. LITTLE INTEREST OR PLEASURE IN DOING THINGS: 0
SUM OF ALL RESPONSES TO PHQ QUESTIONS 1-9: 0
2. FEELING DOWN, DEPRESSED OR HOPELESS: 0

## 2023-03-15 NOTE — PATIENT INSTRUCTIONS
Learning About Dental Care for Older Adults  Dental care for older adults: Overview  Dental care for older people is much the same as for younger adults. But older adults do have concerns that younger adults do not. Older adults may have problems with gum disease and decay on the roots of their teeth. They may need missing teeth replaced or broken fillings fixed. Or they may have dentures that need to be cared for. Some older adults may have trouble holding a toothbrush. You can help remind the person you are caring for to brush and floss their teeth or to clean their dentures. In some cases, you may need to do the brushing and other dental care tasks. People who have trouble using their hands or who have dementia may need this extra help. How can you help with dental care? Normal dental care  To keep the teeth and gums healthy:  Brush the teeth with fluoride toothpaste twice a day--in the morning and at night--and floss at least once a day. Plaque can quickly build up on the teeth of older adults. Watch for the signs of gum disease. These signs include gums that bleed after brushing or after eating hard foods, such as apples. See a dentist regularly. Many experts recommend checkups every 6 months. Keep the dentist up to date on any new medications the person is taking. Encourage a balanced diet that includes whole grains, vegetables, and fruits, and that is low in saturated fat and sodium. Encourage the person you're caring for not to use tobacco products. They can affect dental and general health. Many older adults have a fixed income and feel that they can't afford dental care. But most [facility] and [facility] have programs in which dentists help older adults by lowering fees. Contact your area's public health offices or  for information about dental care in your area.   Using a toothbrush  Older adults with arthritis sometimes have trouble brushing their teeth because they can't easily hold the toothbrush. Their hands and fingers may be stiff, painful, or weak. If this is the case, you can:  Offer an electric toothbrush.  Enlarge the handle of a non-electric toothbrush by wrapping a sponge, an elastic bandage, or adhesive tape around it.  Push the toothbrush handle through a ball made of rubber or soft foam.  Make the handle longer and thicker by taping Popsicle sticks or tongue depressors to it.  You may also be able to buy special toothbrushes, toothpaste dispensers, and floss holders.  Your doctor may recommend a soft-bristle toothbrush if the person you care for bleeds easily. Bleeding can happen because of a health problem or from certain medicines.  A toothpaste for sensitive teeth may help if the person you care for has sensitive teeth.  How do you brush and floss someone's teeth?  If the person you are caring for has a hard time cleaning their teeth on their own, you may need to brush and floss their teeth for them. It may be easiest to have the person sit and face away from you, and to sit or stand behind them. That way you can steady their head against your arm as you reach around to floss and brush their teeth. Choose a place that has good lighting and is comfortable for both of you.  Before you begin, gather your supplies. You will need gloves, floss, a toothbrush, and a container to hold water if you are not near a sink. Wash and dry your hands well and put on gloves. Start by flossing:  Gently work a piece of floss between each of the teeth toward the gums. A plastic flossing tool may make this easier, and they are available at most [facility].  Curve the floss around each tooth into a U-shape and gently slide it under the gum line.  Move the floss firmly up and down several times to scrape off the plaque.  After you've finished flossing, throw away the used floss and begin brushing:  Wet the brush and apply toothpaste.  Place the brush at a 45-degree angle where the teeth meet the gums.  Press firmly, and move the brush in small circles over the surface of the teeth. Be careful not to brush too hard. Vigorous brushing can make the gums pull away from the teeth and can scratch the tooth enamel. Brush all surfaces of the teeth, on the tongue side and on the cheek side. Pay special attention to the front teeth and all surfaces of the back teeth. Brush chewing surfaces with short back-and-forth strokes. After you've finished, help the person rinse the remaining toothpaste from their mouth. Where can you learn more? Go to http://www.woods.com/ and enter F944 to learn more about \"Learning About Dental Care for Older Adults. \"  Current as of: June 16, 2022               Content Version: 13.5  © 2006-2022 Healthwise, [employer]. Care instructions adapted under license by [facility] ([facility]). If you have questions about a medical condition or this instruction, always ask your healthcare professional. [place] [address] any warranty or liability for your use of this information. Advance Directives: Care Instructions  Overview  An advance directive is a legal way to state your wishes at the end of your life. It tells your family and your doctor what to do if you can't say what you want. There are two main types of advance directives. You can change them any time your wishes change. Living will. This form tells your family and your doctor your wishes about life support and other treatment. The form is also called a declaration. Medical power of . This form lets you name a person to make treatment decisions for you when you can't speak for yourself. This person is called a health care agent (health care proxy, health care surrogate). The form is also called a durable power of  for health care.   If you do not have an advance directive, decisions about your medical care may be made by a family member, or by a doctor or a  who doesn't know you.  It may help to think of an advance directive as a gift to the people who care for you. If you have one, they won't have to make tough decisions by themselves. For more information, including forms for your state, see the [address] website (www.caringinfo.org/planning/advance-directives/). Follow-up care is a key part of your treatment and safety. Be sure to make and go to all appointments, and call your doctor if you are having problems. It's also a good idea to know your test results and keep a list of the medicines you take. What should you include in an advance directive? Many states have a unique advance directive form. (It may ask you to address specific issues.) Or you might use a universal form that's approved by many states. If your form doesn't tell you what to address, it may be hard to know what to include in your advance directive. Use the questions below to help you get started. Who do you want to make decisions about your medical care if you are not able to? What life-support measures do you want if you have a serious illness that gets worse over time or can't be cured? What are you most afraid of that might happen? (Maybe you're afraid of having pain, losing your independence, or being kept alive by machines.)  Where would you prefer to die? (Your home? A hospital? A nursing home?)  Do you want to donate your organs when you die? Do you want certain Mu-ism practices performed before you die? When should you call for help? Be sure to contact your doctor if you have any questions. Where can you learn more? Go to http://www.ha.com/ and enter R264 to learn more about \"Advance Directives: Care Instructions. \"  Current as of: June 16, 2022               Content Version: 13.5  © 8407-8505 Healthwise, [employer]. Care instructions adapted under license by [employer] [facility] ([facility]).  If you have questions about a medical condition or this instruction, always ask your healthcare professional. [employer] disclaims any warranty or liability for your use of this information.           A Healthy Heart: Care Instructions  Your Care Instructions     Coronary artery disease, also called heart disease, occurs when a substance called plaque builds up in the vessels that supply oxygen-rich blood to your heart muscle. This can narrow the blood vessels and reduce blood flow. A heart attack happens when blood flow is completely blocked. A high-fat diet, smoking, and other factors increase the risk of heart disease.  Your doctor has found that you have a chance of having heart disease. You can do lots of things to keep your heart healthy. It may not be easy, but you can change your diet, exercise more, and quit smoking. These steps really work to lower your chance of heart disease.  Follow-up care is a key part of your treatment and safety. Be sure to make and go to all appointments, and call your doctor if you are having problems. It's also a good idea to know your test results and keep a list of the medicines you take.  How can you care for yourself at home?  Diet    Use less salt when you cook and eat. This helps lower your blood pressure. Taste food before salting. Add only a little salt when you think you need it. With time, your taste buds will adjust to less salt.     Eat fewer snack items, fast foods, canned soups, and other high-salt, high-fat, processed foods.     Read food labels and try to avoid saturated and trans fats. They increase your risk of heart disease by raising cholesterol levels.     Limit the amount of solid fat-butter, margarine, and shortening-you eat. Use olive, peanut, or canola oil when you cook. Bake, broil, and steam foods instead of frying them.     Eat a variety of fruit and vegetables every day. Dark green, deep orange, red, or yellow fruits and vegetables are especially good for you. Examples include spinach, carrots, peaches, and berries.     Foods high  in fiber can reduce your cholesterol and provide important vitamins and minerals. High-fiber foods include whole-grain cereals and breads, oatmeal, beans, brown rice, citrus fruits, and apples.     Eat lean proteins. Heart-healthy proteins include seafood, lean meats and poultry, eggs, beans, peas, nuts, seeds, and soy products.     Limit drinks and foods with added sugar. These include candy, desserts, and soda pop.   Lifestyle changes    If your doctor recommends it, get more exercise. Walking is a good choice. Bit by bit, increase the amount you walk every day. Try for at least 30 minutes on most days of the week. You also may want to swim, bike, or do other activities.     Do not smoke. If you need help quitting, talk to your doctor about stop-smoking programs and medicines. These can increase your chances of quitting for good. Quitting smoking may be the most important step you can take to protect your heart. It is never too late to quit.     Limit alcohol to 2 drinks a day for men and 1 drink a day for women. Too much alcohol can cause health problems.     Manage other health problems such as diabetes, high blood pressure, and high cholesterol. If you think you may have a problem with alcohol or drug use, talk to your doctor.   Medicines    Take your medicines exactly as prescribed. Call your doctor if you think you are having a problem with your medicine.     If your doctor recommends aspirin, take the amount directed each day. Make sure you take aspirin and not another kind of pain reliever, such as acetaminophen (Tylenol).   When should you call for help?   Call 911 if you have symptoms of a heart attack. These may include:    Chest pain or pressure, or a strange feeling in the chest.     Sweating.     Shortness of breath.     Pain, pressure, or a strange feeling in the back, neck, jaw, or upper belly or in one or both shoulders or arms.     Lightheadedness or sudden weakness.     A fast or irregular  heartbeat. After you call 911, the  may tell you to chew 1 adult-strength or 2 to 4 low-dose aspirin. Wait for an ambulance. Do not try to drive yourself. Watch closely for changes in your health, and be sure to contact your doctor if you have any problems. Where can you learn more? Go to http://www.ha.com/ and enter F075 to learn more about \"A Healthy Heart: Care Instructions. \"  Current as of: September 7, 2022               Content Version: 13.5  © 3681-0557 Healthwise, [employer]. Care instructions adapted under license by [facility] ([facility]). If you have questions about a medical condition or this instruction, always ask your healthcare professional. [address] any warranty or liability for your use of this information. Personalized Preventive Plan for Luci Santizo - 3/15/2023  Medicare offers a range of preventive health benefits. Some of the tests and screenings are paid in full while other may be subject to a deductible, co-insurance, and/or copay. Some of these benefits include a comprehensive review of your medical history including lifestyle, illnesses that may run in your family, and various assessments and screenings as appropriate. After reviewing your medical record and screening and assessments performed today your provider may have ordered immunizations, labs, imaging, and/or referrals for you. A list of these orders (if applicable) as well as your Preventive Care list are included within your After Visit Summary for your review. Other Preventive Recommendations:    A preventive eye exam performed by an eye specialist is recommended every 1-2 years to screen for glaucoma; cataracts, macular degeneration, and other eye disorders. A preventive dental visit is recommended every 6 months. Try to get at least 150 minutes of exercise per week or 10,000 steps per day on a pedometer .   Order or download the FREE \"Exercise & Physical Activity: Your Everyday Guide\" from [employer]. Call 5-840.387.1010 or search [employer] online. You need 9620-2242 mg of calcium and 8626-5739 IU of vitamin D per day. It is possible to meet your calcium requirement with diet alone, but a vitamin D supplement is usually necessary to meet this goal.  When exposed to the sun, use a sunscreen that protects against both UVA and UVB radiation with an SPF of 30 or greater. Reapply every 2 to 3 hours or after sweating, drying off with a towel, or swimming. Always wear a seat belt when traveling in a car. Always wear a helmet when riding a bicycle or motorcycle.

## 2023-03-15 NOTE — PROGRESS NOTES
Medicare Annual Wellness Visit    Reyna Castro is here for Medicare AWV    Assessment & Plan   Medicare annual wellness visit, subsequent  Acquired hypothyroidism  -     TSH With Reflex Ft4; Future  Dyslipidemia  -     Comprehensive Metabolic Panel; Future  -     Lipid, Fasting; Future  Essential hypertension  -     Comprehensive Metabolic Panel; Future  -     CBC with Auto Differential; Future  -     Lipid, Fasting; Future  -     TSH With Reflex Ft4; Future  Prediabetes  -     Hemoglobin A1C; Future  -     Comprehensive Metabolic Panel; Future  -     Lipid, Fasting; Future  -     Microalbumin / Creatinine Urine Ratio; Future          - annual labs  - con't Synthroid 137 mcg  - BP stable and well controlled, con't Lisinopril, HCTZ, aldactone, Toprol and Procardia  - con't Lipitor  - work on diet/exercise    Recommendations for [employer] Due: see orders and patient instructions/AVS.  Recommended screening schedule for the next 5-10 years is provided to the patient in written form: see Patient Instructions/AVS.     Return for Medicare Annual Wellness Visit in 1 year. Subjective   The following acute and/or chronic problems were also addressed today:    HTN    Does patient check BP regularly at home? - Yes  Current Medication regimen - HCTZ, Lisinopril, Toprol, Procardia, aldactone  Tolerating medications well? - yes    Shortness of breath or chest pain? No  Headache or visual complaints? No  Neurologic changes like confusion? No  Extremity edema? No    BP Readings from Last 3 Encounters:   03/15/23 116/86   03/24/22 (!) 142/88   03/10/22 (!) 152/84     Diet is decent. He is exercising, but not as much during the winter months. He does walk quite a bit.     Wt Readings from Last 3 Encounters:   03/15/23 232 lb (105.2 kg)   03/24/22 223 lb 9.6 oz (101.4 kg)   02/24/22 220 lb 9.6 oz (100.1 kg)     Patient's complete Health Risk Assessment and screening values have been reviewed and are found in Flowsheets. The following problems were reviewed today and where indicated follow up appointments were made and/or referrals ordered. Positive Risk Factor Screenings with Interventions:                 Weight and Activity:  Physical Activity: Sufficiently Active    Days of Exercise per Week: 5 days    Minutes of Exercise per Session: 60 min     On average, how many days per week do you engage in moderate to strenuous exercise (like a brisk walk)?: 5 days  Have you lost any weight without trying in the past 3 months?: No  Body mass index: (!) 30.61  Dentist Screen:  Have you seen the dentist within the past year?: (!) No    Intervention:  Advised to schedule with their dentist        Advanced Directives:  Do you have a Living Will?: (!) No    Intervention:  Declines, he does have someone he will contact          Objective   Vitals:    03/15/23 1428 03/15/23 1435   BP: (!) 138/92 116/86   Site:  Left Upper Arm   Position:  Sitting   Cuff Size:  Medium Adult   Pulse: 73    Resp: 16    Temp: 97.8 °F (36.6 °C)    TempSrc: Infrared    SpO2: 97%    Weight: 232 lb (105.2 kg)    Height: 6' 1\" (1.854 m)       Body mass index is 30.61 kg/m².         General Appearance: alert and oriented to person, place and time, well developed and well- nourished, in no acute distress  Skin: warm and dry, no rash or erythema  Head: normocephalic and atraumatic  Eyes: pupils equal, round, and reactive to light, extraocular eye movements intact, conjunctivae normal  ENT: tympanic membrane, external ear and ear canal normal bilaterally, nose without deformity, nasal mucosa and turbinates normal without polyps  Neck: supple and non-tender without mass, no thyromegaly or thyroid nodules, no cervical lymphadenopathy  Pulmonary/Chest: clear to auscultation bilaterally- no wheezes, rales or rhonchi, normal air movement, no respiratory distress  Cardiovascular: normal rate, regular rhythm, normal S1 and S2, no murmurs, rubs, clicks, or gallops, distal pulses intact, no carotid bruits  Abdomen: soft, non-tender, non-distended, normal bowel sounds, no masses or organomegaly  Extremities: no cyanosis, clubbing or edema  Musculoskeletal: normal range of motion, no joint swelling, deformity or tenderness  Neurologic: reflexes normal and symmetric, no cranial nerve deficit, gait, coordination and speech normal       Allergies   Allergen Reactions    Penicillins Swelling    Sulfa Antibiotics Swelling     Prior to Visit Medications    Medication Sig Taking?  Authorizing Provider   levothyroxine (SYNTHROID) 137 MCG tablet Take 1 tablet by mouth once daily Yes KATHRINE Alfaro CNP   atorvastatin (LIPITOR) 40 MG tablet Take 1 tablet by mouth once daily Yes KATHRINE Curtis CNP   lisinopril (PRINIVIL;ZESTRIL) 40 MG tablet Take 1 tablet by mouth once daily Yes KATHRINE Alfaro CNP   spironolactone (ALDACTONE) 25 MG tablet Take 1 tablet by mouth once daily Yes KATHRINE Alfaro CNP   hydroCHLOROthiazide (MICROZIDE) 12.5 MG capsule Take 1 capsule by mouth once daily Yes KATHRINE Alfaro CNP   metoprolol succinate (TOPROL XL) 25 MG extended release tablet Take 1 tablet by mouth once daily Yes KATHRINE Alfaro CNP   potassium chloride (KLOR-CON M) 20 MEQ extended release tablet Take 1 tablet by mouth once daily Yes KATHRINE Alfaro CNP   NIFEdipine (PROCARDIA XL) 90 MG extended release tablet Take 1 tablet by mouth daily Yes KATHRINE Alfaro CNP   chlorthalidone (HYGROTON) 25 MG tablet Take 1 tablet by mouth daily  KATHRINE Alfaro CNP   potassium chloride (KLOR-CON) 20 MEQ packet Take 20 mEq by mouth daily  Historical Provider, MD   lisinopril-hydrochlorothiazide (PRINZIDE;ZESTORETIC) 20-25 MG per tablet Take 2 tablets by mouth daily  KATHRINE Alfaro CNP       CareTeam (Including outside providers/suppliers regularly involved in providing care):   Patient Care Team:  KATHRINE Alfaro CNP as PCP - General (Family Medicine)  KATHRINE Griffith CNP as PCP - Empaneled Provider     Reviewed and updated this visit:  Tobacco  Allergies  Meds  Med Hx  Surg Hx  Soc Hx  Fam Hx             KATHRINE Griffith CNP

## 2023-03-24 ENCOUNTER — TELEPHONE (OUTPATIENT)
Dept: FAMILY MEDICINE CLINIC | Age: 73
End: 2023-03-24

## 2023-03-24 LAB
A/G RATIO: 1.5 (ref 1.5–2.5)
ABSOLUTE BASO #: 100 /CMM (ref 0–200)
ABSOLUTE EOS #: 100 /CMM (ref 0–500)
ABSOLUTE LYMPH #: 1800 /CMM (ref 1000–4800)
ABSOLUTE MONO #: 700 /CMM (ref 0–800)
ABSOLUTE NEUT #: 3700 /CMM (ref 1800–7700)
ALBUMIN SERPL-MCNC: 4.2 G/DL (ref 3.5–5)
ALP BLD-CCNC: 89 IU/L (ref 41–137)
ALT SERPL-CCNC: 20 IU/L (ref 10–40)
ANION GAP SERPL CALCULATED.3IONS-SCNC: 5 MMOL/L (ref 4–12)
AST SERPL-CCNC: 20 IU/L (ref 15–41)
BASOPHILS RELATIVE PERCENT: 1.1 % (ref 0–2)
BILIRUB SERPL-MCNC: 0.8 MG/DL (ref 0.2–1)
BUN BLDV-MCNC: 21 MG/DL (ref 7–20)
CALCIUM SERPL-MCNC: 9.5 MG/DL (ref 8.8–10.5)
CHLORIDE BLD-SCNC: 106 MEQ/L (ref 101–111)
CHOLESTEROL/HDL RELATIVE RISK: 2.9 (ref 4–5)
CHOLESTEROL: 117 MG/DL
CO2: 32 MEQ/L (ref 21–32)
CREAT SERPL-MCNC: 1.08 MG/DL (ref 0.6–1.3)
CREATININE CLEARANCE: >60
CREATININE, RANDOM URINE: 113.8 MG/DL
DIRECT-LDL / HDL RISK: 1.5
EOSINOPHILS RELATIVE PERCENT: 1.7 % (ref 0–6)
ESTIMATED AVERAGE GLUCOSE: 131 MG/DL
GLUCOSE: 116 MG/DL (ref 70–110)
HBA1C MFR BLD: 6.2 % (ref 4.4–6.4)
HCT VFR BLD CALC: 42.9 % (ref 40–49)
HDLC SERPL-MCNC: 40 MG/DL
HEMOGLOBIN: 14.6 GM/DL (ref 13.5–16.5)
LDL CHOLESTEROL DIRECT: 60 MG/DL
LYMPHOCYTES RELATIVE PERCENT: 28 % (ref 15–45)
MCH RBC QN AUTO: 30.9 PG (ref 27.5–33)
MCHC RBC AUTO-ENTMCNC: 34.1 GM/DL (ref 33–36)
MCV RBC AUTO: 90.6 CU MIC (ref 80–97)
MICROALBUMIN UR-MCNC: 11.1 MG/L
MICROALBUMIN/CREAT UR-RTO: 9.7 MG/GM (ref 0–30)
MONOCYTES RELATIVE PERCENT: 10.9 % (ref 2–10)
NEUTROPHILS RELATIVE PERCENT: 58.3 % (ref 40–70)
NUCLEATED RBCS: 0 /100 WBC
PDW BLD-RTO: 13.6 % (ref 12–16)
PLATELET # BLD: 210 TH/CMM (ref 150–400)
POTASSIUM SERPL-SCNC: 4.2 MEQ/L (ref 3.6–5)
RBC # BLD: 4.73 MIL/CMM (ref 4.5–6)
SODIUM BLD-SCNC: 143 MEQ/L (ref 135–145)
TOTAL PROTEIN: 7 G/DL (ref 6.2–8)
TRIGL SERPL-MCNC: 95 MG/DL
TSH REFLEX: 2.22 MCIU/ML (ref 0.49–4.67)
VLDLC SERPL CALC-MCNC: 19 MG/DL
WBC # BLD: 6.3 TH/CMM (ref 4.4–10.5)

## 2023-03-24 NOTE — TELEPHONE ENCOUNTER
----- Message from KATHRINE Leos CNP sent at 3/24/2023 11:09 AM EDT -----  Let Michelle Kumar know his labs are all stable and within appropriate ranges.

## 2023-05-25 DIAGNOSIS — E78.5 DYSLIPIDEMIA: ICD-10-CM

## 2023-05-25 RX ORDER — ATORVASTATIN CALCIUM 40 MG/1
TABLET, FILM COATED ORAL
Qty: 90 TABLET | Refills: 3 | Status: SHIPPED | OUTPATIENT
Start: 2023-05-25

## 2023-08-25 DIAGNOSIS — I49.9 IRREGULAR HEART BEAT: ICD-10-CM

## 2023-08-25 DIAGNOSIS — I49.3 FREQUENT PVCS: ICD-10-CM

## 2023-08-25 DIAGNOSIS — I10 ESSENTIAL HYPERTENSION: ICD-10-CM

## 2023-08-25 RX ORDER — POTASSIUM CHLORIDE 20 MEQ/1
TABLET, EXTENDED RELEASE ORAL
Qty: 90 TABLET | Refills: 3 | Status: SHIPPED | OUTPATIENT
Start: 2023-08-25

## 2023-08-25 RX ORDER — METOPROLOL SUCCINATE 25 MG/1
TABLET, EXTENDED RELEASE ORAL
Qty: 90 TABLET | Refills: 3 | Status: SHIPPED | OUTPATIENT
Start: 2023-08-25

## 2023-08-25 NOTE — TELEPHONE ENCOUNTER
Future Appointments   Date Time Provider [address]  [address]   3/18/2024  2:20 PM Ellen Ware, [address]

## 2023-09-18 DIAGNOSIS — E87.6 HYPOKALEMIA: ICD-10-CM

## 2023-09-18 DIAGNOSIS — E03.9 ACQUIRED HYPOTHYROIDISM: ICD-10-CM

## 2023-09-18 DIAGNOSIS — I10 ESSENTIAL HYPERTENSION: ICD-10-CM

## 2023-09-18 RX ORDER — LEVOTHYROXINE SODIUM 137 UG/1
TABLET ORAL
Qty: 90 TABLET | Refills: 3 | Status: SHIPPED | OUTPATIENT
Start: 2023-09-18

## 2023-09-18 RX ORDER — LISINOPRIL 40 MG/1
TABLET ORAL
Qty: 90 TABLET | Refills: 3 | Status: SHIPPED | OUTPATIENT
Start: 2023-09-18

## 2023-09-18 RX ORDER — HYDROCHLOROTHIAZIDE 12.5 MG/1
CAPSULE, GELATIN COATED ORAL
Qty: 90 CAPSULE | Refills: 3 | Status: SHIPPED | OUTPATIENT
Start: 2023-09-18

## 2023-09-18 RX ORDER — SPIRONOLACTONE 25 MG/1
TABLET ORAL
Qty: 90 TABLET | Refills: 3 | Status: SHIPPED | OUTPATIENT
Start: 2023-09-18

## 2023-09-18 NOTE — TELEPHONE ENCOUNTER
Recent Visits  Date Type Provider Dept   03/15/23 Office Visit Sadiq Henriquez, APRN - CNP [facility]   Showing recent visits within past 540 days with a meds authorizing provider and meeting all other requirements  Future Appointments  No visits were found meeting these conditions.   Showing future appointments within next 150 days with a meds authorizing provider and meeting all other requirements

## 2023-09-20 RX ORDER — NIFEDIPINE 90 MG/1
90 TABLET, EXTENDED RELEASE ORAL DAILY
Qty: 90 TABLET | Refills: 3 | Status: SHIPPED | OUTPATIENT
Start: 2023-09-20

## 2024-01-23 NOTE — TELEPHONE ENCOUNTER
LMOM for call back. Name: Maximus Madison  Date: 1/23/24    Referring Physician: No ref. provider found    HISTORY OF PRESENT ILLNESS   Maximus Madison is a 63 year old male who presents for evaluation and management of type 2 diabetes. He was diagnosed with diabetes about 11 years ago. He had gotten it under control by having a low-carb diet as well as working out intensely.     I had started him on Trulicity. He is doing very well on this medication. At the last visit, I had continued his Trulicity 3.0mg and his glipizide since he had been noting elevated blood sugars in the morning. He has been taking the glipizide in the morning sometimes and in the evening sometimes and finding that this does not help fasting blood sugars.     Since the last visit, he had been admitted to the hospital with ischemic colitis. All medications have been restarted except for Trulicity.     Blood Glucose Today: 176 (fasting for 10 hours)  HbA1C or glycohemoglobin is 9.0 today (and it was 7.6 on 10/17/23 and it was 7.4 on 7/18/23 and it was 8.8 on 4/18/23 and it was 7.1 on 11/30/22 and it was 9.2 on 8/24/22 and it was 8.5 on 5/18/22 and it was 8.2 on 2/16/22 and it was 7.4 on 8/18/21, it was 9.9 on 5/13/21)  Type 1 or Type 2?: Type 2  Medications for DM : Metformin 1g PO bid; glipizide 5mg PO bid; Pioglitazone 15mg PO qAM  Checking 1-2 times per day  Fasting- 160-170s   Episodes of hypoglycemia: none    Dietary compliance:   He is eating more nuts, and less carbs, more veggies    Exercise: he has joined the gym, kickboxing    Polyuria/polydipsia: none    Blurred vision: none    Flu Vaccine This Season: yes    Covid Vaccine: yes    REVIEW OF SYSTEMS  CV: Cardiovascular disease present: none, but has A fib         Hypertension present: on meds, at goal          Hyperlipidemia present: at goal, not on meds (7/13/23)         Peripheral Vascular Disease present: none    : Nephropathy present: none (7/13/23); creatinine- 1.28     Neuro: Neuropathy  present: none    Eyes: Diabetic retinopathy present: none            Most recent visit to eye doctor in last 12 months: Recent    Skin: Infection or ulceration: none    Osteoporosis: none    Thyroid disease: none    Medications:     Current Outpatient Medications:     metFORMIN 500 MG Oral Tab, Take 2 tablets (1,000 mg total) by mouth 2 (two) times daily with meals., Disp: 360 tablet, Rfl: 0    glipiZIDE 5 MG Oral Tab, Take 1 tablet (5 mg total) by mouth 2 (two) times daily before meals., Disp: 180 tablet, Rfl: 0    pioglitazone 15 MG Oral Tab, Take 1 tablet (15 mg total) by mouth daily., Disp: 90 tablet, Rfl: 0    Dulaglutide (TRULICITY) 3 MG/0.5ML Subcutaneous Solution Pen-injector, Inject 3 mg into the skin once a week. (Patient not taking: Reported on 1/23/2024), Disp: 6 mL, Rfl: 3    levoFLOXacin 500 MG Oral Tab, Take 1 tablet (500 mg total) by mouth daily. Give on an empty stomach. Separate administration from antacids and iron products by at least 2 hours., Disp: 2 tablet, Rfl: 0    aspirin 81 MG Oral Chew Tab, Chew 1 tablet (81 mg total) by mouth daily., Disp: , Rfl:     ONETOUCH ULTRA In Vitro Strip, CHECK BLOOD GLUCOSE TWICE DAILY(FASTING AND 2 HOURS AFTER BIGGEST MEAL), Disp: 200 strip, Rfl: 0    ONETOUCH DELICA PLUS [id number] Does not apply Misc, CHECK BLOOD GLUCOSE TWICE DAILY(FASTING AND 2 HOURS AFTER BIGGEST MEAL), Disp: 200 each, Rfl: 0    Blood Glucose Monitoring Suppl Does not apply Misc, Use as directed to check blood glucose twice a day - fasting and 2 hours after biggest meal, Disp: 200 each, Rfl: 0    Blood Glucose Monitoring Suppl Does not apply Kit, Use as directed to check blood glucose twice a day - fasting and 2 hours after biggest meal, Disp: 1 kit, Rfl: 0    Blood Glucose Monitoring Suppl Does not apply Device, Use as directed to check blood glucose twice a day -fasting and 2 hours after biggest meal, Disp: 200 each, Rfl: 0    metoprolol succinate 50 MG Oral Tablet 24 Hr, Take 1 tablet  (50 mg total) by mouth Daily Beta Blocker., Disp: 30 tablet, Rfl: 5     Allergies:   Allergies   Allergen Reactions    Sulfa Antibiotics SWELLING     Only in eye drops       Social History:   Social History     Socioeconomic History    Marital status:    Tobacco Use    Smoking status: Former     Packs/day: 0     Types: Cigarettes     Quit date: 1980     Years since quittin.0    Smokeless tobacco: Never    Tobacco comments:     year quit    Vaping Use    Vaping Use: Never used   Substance and Sexual Activity    Alcohol use: Not Currently     Comment: Socially    Drug use: No   Other Topics Concern    Caffeine Concern Yes     Comment: coffee, 3 cups daily    Pt has a pacemaker No    Pt has a defibrillator No    Reaction to local anesthetic No     Social Determinants of Health     Food Insecurity: No Food Insecurity (2023)    Food Insecurity     Food Insecurity: Never true   Transportation Needs: No Transportation Needs (2023)    Transportation Needs     Lack of Transportation: No   Housing Stability: Low Risk  (2023)    Housing Stability     Housing Instability: No       Medical History:   Past Medical History:   Diagnosis Date    Actinic keratoses     Arrhythmia     Diabetes type 2, controlled (HCC) 2015    History of blood in urine 2011    ER visit for blood in urine    History of nonmelanoma skin cancer     BCC    Lattice degeneration of retina 2015    Myopia of both eyes with astigmatism and presbyopia     OU    Other and unspecified hyperlipidemia     Type II or unspecified type diabetes mellitus without mention of complication, not stated as uncontrolled 2011    Vitreous floaters of both eyes 2015       Surgical history:   Past Surgical History:   Procedure Laterality Date    APPENDECTOMY      KNEE SURGERY      TONSILLECTOMY      VASECTOMY  1993 vasectomy reversal         PHYSICAL EXAM  Vitals:    24 0848   BP: 118/69    Pulse: 78   Weight: 246 lb (111.6 kg)   Height: 6' 2\" (1.88 m)       General Appearance:  alert, well developed, in no acute distress  Eyes:  normal conjunctivae, sclera., normal sclera and normal pupils  Neuro:  sensory grossly intact and motor grossly intact, normal monofilament exam  Psychiatric:  oriented to time, self, and place  Nutritional:  no abnormal weight gain or loss    Lab Data:   Lab Results   Component Value Date     (H) 05/13/2021    A1C 9.0 (A) 01/23/2024     Lab Results   Component Value Date     (H) 12/20/2023    BUN 7 (L) 12/20/2023    BUNCREA 6.7 (L) 12/20/2023    CREATSERUM 1.05 12/20/2023    ANIONGAP 6 12/20/2023    GFRNAA 57 (L) 05/17/2022    GFRAA 66 05/17/2022    CA 8.6 (L) 12/20/2023    OSMOCALC 290 12/20/2023    ALKPHO 57 12/19/2023    AST 14 12/19/2023    ALT 13 12/19/2023    BILT 0.7 12/19/2023    TP 6.1 12/19/2023    ALB 3.8 12/19/2023    GLOBULIN 2.3 (L) 12/19/2023     12/20/2023    K 3.8 12/20/2023     12/20/2023    CO2 24.0 12/20/2023     Lab Results   Component Value Date    CHOLEST 137 07/13/2023    TRIG 71 07/13/2023    HDL 51 07/13/2023    LDL 72 07/13/2023    VLDL 11 07/13/2023    NONHDLC 86 07/13/2023    CALCNONHDL 121 04/30/2016     Lab Results   Component Value Date    MALBP 0.58 07/13/2023    CREUR 85.90 07/13/2023         ASSESSMENT/PLAN:    This is a 63 year-old man here for evaluation and management of uncontrolled type 2 diabetes. We discussed the ABCs of DM.     1.) Hyperglycemia Management- We discussed the importance of glycemic control to prevent complications of diabetes. We discussed the importance of SBGM.   - Continue metformin 1000mg PO bid and glipizide at 5mg PO bid  - Restart Trulicity but not at 3.0 mg, but at a lower dose of 1.5mg for 30 days.   - Continue pioglitazone 15mg to be taken on an as needed basis;  - Check blood sugars fasting and two hours after meals and if blood sugars do not come to goal in a month    2.)  Management of Diabetic Complications- We discussed the complications of diabetes include retinopathy, neuropathy, nephropathy and cardiovascular disease.   - Ophtho is up to date  - Flu and Covid vaccine are up to date  - BP is at goal  - Lipids are controlled, not on any meds, check in 3 months  - MAC- at goal, check in 3 months  - CMP- creatinine elevated, check in 3 months  - Neuropathy- none  - CAD- none    3.) Lifestyle Management for Diabetes- We discussed importance of a low CHO diet, and recommend 45gm per meal or 135gm per day. We discussed the importance of trying to follow a Mediterranean diet, with an emphasis on vegetables at every meal, with lots whole grains, and protein from either plant-based sources, or poultry and fish.   - Diet- he is working on incorporating more vegetables and following the 'Diabetes Plate' method of eating and keeping to 140g of carbs a day  - Exercise- he has joined the gym    Return to clinic in 3 months    Prior to this encounter, I spent over 15 minutes with preparing for the visit, including reviewing documents from other specialties as well as from PCP and going over test results. During the face to face encounter, I spent an additional 15 minutes which were determined for follow-up. Greater than 50% of the time was spent in counseling, anticipatory guidance, and coordination of care. Patient concerns were answered to the best of my knowledge.         1/23/24  Nuha Bravo MD

## 2024-03-18 ENCOUNTER — OFFICE VISIT (OUTPATIENT)
Dept: FAMILY MEDICINE CLINIC | Age: 74
End: 2024-03-18
Payer: MEDICARE

## 2024-03-18 VITALS
BODY MASS INDEX: 30.46 KG/M2 | OXYGEN SATURATION: 95 % | TEMPERATURE: 97.8 F | DIASTOLIC BLOOD PRESSURE: 72 MMHG | WEIGHT: 229.8 LBS | HEIGHT: 73 IN | SYSTOLIC BLOOD PRESSURE: 130 MMHG | HEART RATE: 57 BPM | RESPIRATION RATE: 14 BRPM

## 2024-03-18 DIAGNOSIS — E78.5 DYSLIPIDEMIA: ICD-10-CM

## 2024-03-18 DIAGNOSIS — Z71.89 LIVING WILL, COUNSELING/DISCUSSION: ICD-10-CM

## 2024-03-18 DIAGNOSIS — Z00.00 MEDICARE ANNUAL WELLNESS VISIT, SUBSEQUENT: Primary | ICD-10-CM

## 2024-03-18 DIAGNOSIS — I10 ESSENTIAL HYPERTENSION: ICD-10-CM

## 2024-03-18 DIAGNOSIS — Z12.11 SCREENING FOR COLON CANCER: ICD-10-CM

## 2024-03-18 DIAGNOSIS — E03.9 ACQUIRED HYPOTHYROIDISM: ICD-10-CM

## 2024-03-18 DIAGNOSIS — R73.03 PREDIABETES: ICD-10-CM

## 2024-03-18 PROCEDURE — G0439 PPPS, SUBSEQ VISIT: HCPCS | Performed by: NURSE PRACTITIONER

## 2024-03-18 PROCEDURE — 3075F SYST BP GE 130 - 139MM HG: CPT | Performed by: NURSE PRACTITIONER

## 2024-03-18 PROCEDURE — 3078F DIAST BP <80 MM HG: CPT | Performed by: NURSE PRACTITIONER

## 2024-03-18 PROCEDURE — 1123F ACP DISCUSS/DSCN MKR DOCD: CPT | Performed by: NURSE PRACTITIONER

## 2024-03-18 SDOH — ECONOMIC STABILITY: FOOD INSECURITY: WITHIN THE PAST 12 MONTHS, THE FOOD YOU BOUGHT JUST DIDN'T LAST AND YOU DIDN'T HAVE MONEY TO GET MORE.: NEVER TRUE

## 2024-03-18 SDOH — ECONOMIC STABILITY: FOOD INSECURITY: WITHIN THE PAST 12 MONTHS, YOU WORRIED THAT YOUR FOOD WOULD RUN OUT BEFORE YOU GOT MONEY TO BUY MORE.: NEVER TRUE

## 2024-03-18 SDOH — ECONOMIC STABILITY: INCOME INSECURITY: HOW HARD IS IT FOR YOU TO PAY FOR THE VERY BASICS LIKE FOOD, HOUSING, MEDICAL CARE, AND HEATING?: NOT HARD AT ALL

## 2024-03-18 ASSESSMENT — PATIENT HEALTH QUESTIONNAIRE - PHQ9
1. LITTLE INTEREST OR PLEASURE IN DOING THINGS: NOT AT ALL
SUM OF ALL RESPONSES TO PHQ QUESTIONS 1-9: 0
SUM OF ALL RESPONSES TO PHQ QUESTIONS 1-9: 0
2. FEELING DOWN, DEPRESSED OR HOPELESS: NOT AT ALL
SUM OF ALL RESPONSES TO PHQ QUESTIONS 1-9: 0
SUM OF ALL RESPONSES TO PHQ9 QUESTIONS 1 & 2: 0
SUM OF ALL RESPONSES TO PHQ QUESTIONS 1-9: 0

## 2024-03-18 NOTE — PROGRESS NOTES
cyanosis, clubbing or edema  Musculoskeletal: normal range of motion, no joint swelling, deformity or tenderness  Neurologic: reflexes normal and symmetric, no cranial nerve deficit, gait, coordination and speech normal       Allergies   Allergen Reactions    Penicillins Swelling    Sulfa Antibiotics Swelling     Prior to Visit Medications    Medication Sig Taking? Authorizing Provider   NIFEdipine (PROCARDIA XL) 90 MG extended release tablet Take 1 tablet by mouth daily Yes Robert Ricks APRN - CNP   spironolactone (ALDACTONE) 25 MG tablet Take 1 tablet by mouth once daily Yes Robert Ricks APRN - CNP   hydroCHLOROthiazide (MICROZIDE) 12.5 MG capsule Take 1 capsule by mouth once daily Yes Robert Ricks APRN - CNP   levothyroxine (SYNTHROID) 137 MCG tablet Take 1 tablet by mouth once daily Yes Robert Ricks APRN - CNP   lisinopril (PRINIVIL;ZESTRIL) 40 MG tablet Take 1 tablet by mouth once daily Yes Robert Ricks APRN - CNP   potassium chloride (KLOR-CON M) 20 MEQ extended release tablet Take 1 tablet by mouth once daily Yes Robert Ricks APRN - CNP   metoprolol succinate (TOPROL XL) 25 MG extended release tablet Take 1 tablet by mouth once daily Yes Robert Ricks APRN - CNP   atorvastatin (LIPITOR) 40 MG tablet Take 1 tablet by mouth once daily Yes Robert Ricks APRN - CNP   chlorthalidone (HYGROTON) 25 MG tablet Take 1 tablet by mouth daily  Robert Ricks APRN - CNP   potassium chloride (KLOR-CON) 20 MEQ packet Take 20 mEq by mouth daily  Provider, MD Martha   lisinopril-hydrochlorothiazide (PRINZIDE;ZESTORETIC) 20-25 MG per tablet Take 2 tablets by mouth daily  Robert Ricks APRN - CNP       CareTeam (Including outside providers/suppliers regularly involved in providing care):   Patient Care Team:  Robert Ricks APRN - CNP as PCP - General (Family Medicine)  Robert Ricks APRN - CNP as PCP - Empaneled Provider     Reviewed and updated this visit:  Tobacco  Allergies

## 2024-03-18 NOTE — PATIENT INSTRUCTIONS

## 2024-03-19 ENCOUNTER — CLINICAL DOCUMENTATION (OUTPATIENT)
Dept: SPIRITUAL SERVICES | Age: 74
End: 2024-03-19

## 2024-03-19 NOTE — ACP (ADVANCE CARE PLANNING)
included).   ACP outreach will be attempted in about one week if return call not received.             [] 2nd -  Date:                 Intervention:  [] Spoke with Patient  [] Left Voice mail [] Email / Mail    [] MyChart  [] Other (Specify) :              Outcomes:                [] 3rd -  Date:                Intervention:  [] Spoke with Patient   [] Left Voice mail [] Email / Mail    [] MyChart  [] Other (Specify) :       Outcomes:           []  Additional Outreach -  Date:     (Specify Dates & special circumstances):    Outcomes:         Thank you for this referral.

## 2024-04-04 ENCOUNTER — COMMUNITY OUTREACH (OUTPATIENT)
Dept: FAMILY MEDICINE CLINIC | Age: 74
End: 2024-04-04

## 2024-04-17 NOTE — TELEPHONE ENCOUNTER
First NP appt 9 mos out. Ok to schedule or pt can find another provider who they can see sooner.
Roosevelt Noel and his wife Denise Walter former pts of Dr. Brad Virgen are requesting to see you as new pts.
Ruthy notified and transferred to scheduling
n/a

## 2024-05-17 ENCOUNTER — TELEPHONE (OUTPATIENT)
Dept: FAMILY MEDICINE CLINIC | Age: 74
End: 2024-05-17

## 2024-05-17 LAB
A/G RATIO: 1.4 (ref 1.5–2.5)
ALBUMIN: 4.2 G/DL (ref 3.5–5)
ALP BLD-CCNC: 77 IU/L (ref 41–137)
ALT SERPL-CCNC: 15 IU/L (ref 10–40)
ANION GAP SERPL CALCULATED.3IONS-SCNC: 8 MMOL/L (ref 4–12)
AST SERPL-CCNC: 18 IU/L (ref 15–41)
BASOPHILS ABSOLUTE: 100 /CMM (ref 0–200)
BASOPHILS RELATIVE PERCENT: 0.8 % (ref 0–2)
BILIRUB SERPL-MCNC: 0.8 MG/DL (ref 0.2–1)
BUN BLDV-MCNC: 22 MG/DL (ref 7–20)
CALCIUM SERPL-MCNC: 9.4 MG/DL (ref 8.8–10.5)
CHLORIDE BLD-SCNC: 105 MEQ/L (ref 101–111)
CHOLESTEROL, TOTAL: 112 MG/DL
CHOLESTEROL/HDL RELATIVE RISK: 2.6 (ref 4–5)
CO2: 29 MEQ/L (ref 21–32)
CREAT SERPL-MCNC: 1.14 MG/DL (ref 0.6–1.3)
CREATININE CLEARANCE: >60
DIRECT-LDL / HDL RISK: 1.2
EOSINOPHILS ABSOLUTE: 200 /CMM (ref 0–500)
EOSINOPHILS RELATIVE PERCENT: 2.5 % (ref 0–6)
ESTIMATED AVERAGE GLUCOSE: 134 MG/DL
GLUCOSE: 96 MG/DL (ref 70–110)
HBA1C MFR BLD: 6.3 % (ref 4.4–6.4)
HCT VFR BLD CALC: 42.5 % (ref 40–49)
HDLC SERPL-MCNC: 42 MG/DL
HEMOGLOBIN: 14.4 GM/DL (ref 13.5–16.5)
LDL CHOLESTEROL DIRECT: 53 MG/DL
LYMPHOCYTES ABSOLUTE: 1400 /CMM (ref 1000–4800)
LYMPHOCYTES RELATIVE PERCENT: 23 % (ref 15–45)
MCH RBC QN AUTO: 31.2 PG (ref 27.5–33)
MCHC RBC AUTO-ENTMCNC: 33.9 GM/DL (ref 33–36)
MCV RBC AUTO: 92 CU MIC (ref 80–97)
MONOCYTES ABSOLUTE: 700 /CMM (ref 0–800)
MONOCYTES RELATIVE PERCENT: 11.1 % (ref 2–10)
NEUTROPHILS ABSOLUTE: 3900 /CMM (ref 1800–7700)
NEUTROPHILS RELATIVE PERCENT: 62.6 % (ref 40–70)
NUCLEATED RBCS: 0.1 /100 WBC
PDW BLD-RTO: 13.8 % (ref 12–16)
PLATELET # BLD: 223 TH/CMM (ref 150–400)
POTASSIUM SERPL-SCNC: 3.6 MEQ/L (ref 3.6–5)
RBC # BLD: 4.61 MIL/CMM (ref 4.5–6)
SODIUM BLD-SCNC: 142 MEQ/L (ref 135–145)
TOTAL PROTEIN: 7.3 G/DL (ref 6.2–8)
TRIGL SERPL-MCNC: 99 MG/DL
TSH REFLEX: 2.39 MCIU/ML (ref 0.49–4.67)
VLDLC SERPL CALC-MCNC: 19 MG/DL
WBC # BLD: 6.2 TH/CMM (ref 4.4–10.5)

## 2024-05-17 NOTE — TELEPHONE ENCOUNTER
----- Message from KATHRINE Maxwell - CNP sent at 5/17/2024 11:06 AM EDT -----  Let Wally know his labs are all in stable appropriate ranges.

## 2024-05-23 DIAGNOSIS — E78.5 DYSLIPIDEMIA: ICD-10-CM

## 2024-05-23 RX ORDER — ATORVASTATIN CALCIUM 40 MG/1
TABLET, FILM COATED ORAL
Qty: 90 TABLET | Refills: 3 | Status: SHIPPED | OUTPATIENT
Start: 2024-05-23

## 2024-05-23 NOTE — TELEPHONE ENCOUNTER
Recent Visits  Date Type Provider Dept   03/18/24 Office Visit Robert Ricks APRN - CNP [facility]   03/15/23 Office Visit Robert Ricks APRN - CNP [facility]   Showing recent visits within past 540 days with a meds authorizing provider and meeting all other requirements  Future Appointments  No visits were found meeting these conditions.  Showing future appointments within next 150 days with a meds authorizing provider and meeting all other requirements

## 2024-08-24 DIAGNOSIS — I49.3 FREQUENT PVCS: ICD-10-CM

## 2024-08-24 DIAGNOSIS — I10 ESSENTIAL HYPERTENSION: ICD-10-CM

## 2024-08-24 DIAGNOSIS — I49.9 IRREGULAR HEART BEAT: ICD-10-CM

## 2024-08-26 DIAGNOSIS — I10 ESSENTIAL HYPERTENSION: ICD-10-CM

## 2024-08-26 RX ORDER — POTASSIUM CHLORIDE 1500 MG/1
TABLET, EXTENDED RELEASE ORAL DAILY
Qty: 90 TABLET | Refills: 3 | Status: SHIPPED | OUTPATIENT
Start: 2024-08-26

## 2024-08-26 RX ORDER — METOPROLOL SUCCINATE 25 MG/1
TABLET, EXTENDED RELEASE ORAL
Qty: 90 TABLET | Refills: 3 | Status: SHIPPED | OUTPATIENT
Start: 2024-08-26

## 2024-09-15 DIAGNOSIS — E03.9 ACQUIRED HYPOTHYROIDISM: ICD-10-CM

## 2024-09-15 DIAGNOSIS — I10 ESSENTIAL HYPERTENSION: ICD-10-CM

## 2024-09-15 DIAGNOSIS — E87.6 HYPOKALEMIA: ICD-10-CM

## 2024-09-16 RX ORDER — SPIRONOLACTONE 25 MG/1
TABLET ORAL
Qty: 90 TABLET | Refills: 3 | Status: SHIPPED | OUTPATIENT
Start: 2024-09-16

## 2024-09-16 RX ORDER — LISINOPRIL 40 MG/1
TABLET ORAL
Qty: 90 TABLET | Refills: 3 | Status: SHIPPED | OUTPATIENT
Start: 2024-09-16

## 2024-09-16 RX ORDER — HYDROCHLOROTHIAZIDE 12.5 MG/1
CAPSULE ORAL
Qty: 90 CAPSULE | Refills: 3 | Status: SHIPPED | OUTPATIENT
Start: 2024-09-16

## 2024-09-16 RX ORDER — NIFEDIPINE 90 MG/1
90 TABLET, EXTENDED RELEASE ORAL DAILY
Qty: 90 TABLET | Refills: 3 | Status: SHIPPED | OUTPATIENT
Start: 2024-09-16

## 2024-09-16 RX ORDER — LEVOTHYROXINE SODIUM 137 UG/1
TABLET ORAL
Qty: 90 TABLET | Refills: 3 | Status: SHIPPED | OUTPATIENT
Start: 2024-09-16

## 2024-09-28 LAB — NONINV COLON CA DNA+OCC BLD SCRN STL QL: POSITIVE

## 2024-09-30 ENCOUNTER — TELEPHONE (OUTPATIENT)
Dept: FAMILY MEDICINE CLINIC | Age: 74
End: 2024-09-30

## 2024-09-30 DIAGNOSIS — R19.5 POSITIVE COLORECTAL CANCER SCREENING USING COLOGUARD TEST: Primary | ICD-10-CM

## 2024-09-30 NOTE — TELEPHONE ENCOUNTER
----- Message from KATHRINE Dominguez CNP sent at 9/30/2024 10:07 AM EDT -----  Let Wally  know his Cologuard was positive. He needs to see GI for a colonoscopy. Does he have a preference where he goes?

## 2024-09-30 NOTE — TELEPHONE ENCOUNTER
Doing another fecal test is not an option. Since his Cologuard was positive, the best course of action is to do the colonoscopy to rule out colon cancer. I know he doesn't want to do the colonoscopy, but that's the appropriate course of action.

## 2024-09-30 NOTE — TELEPHONE ENCOUNTER
Left message on answering machine. Requested pt to call back at [phone], at their earliest convenience.

## 2025-05-27 DIAGNOSIS — E78.5 DYSLIPIDEMIA: ICD-10-CM

## 2025-05-27 RX ORDER — ATORVASTATIN CALCIUM 40 MG/1
40 TABLET, FILM COATED ORAL DAILY
Qty: 90 TABLET | Refills: 0 | OUTPATIENT
Start: 2025-05-27

## 2025-05-27 NOTE — TELEPHONE ENCOUNTER
Recent Visits  Date Type Provider Dept   03/18/24 Office Visit Robert Ricks, APRN - CNP [facility]   Showing recent visits within past 540 days with a meds authorizing provider and meeting all other requirements  Future Appointments  No visits were found meeting these conditions.  Showing future appointments within next 150 days with a meds authorizing provider and meeting all other requirements

## 2025-05-30 ENCOUNTER — OFFICE VISIT (OUTPATIENT)
Dept: FAMILY MEDICINE CLINIC | Age: 75
End: 2025-05-30
Payer: MEDICARE

## 2025-05-30 VITALS
TEMPERATURE: 96.8 F | BODY MASS INDEX: 30.75 KG/M2 | HEIGHT: 73 IN | DIASTOLIC BLOOD PRESSURE: 62 MMHG | HEART RATE: 58 BPM | SYSTOLIC BLOOD PRESSURE: 108 MMHG | WEIGHT: 232 LBS | RESPIRATION RATE: 12 BRPM | OXYGEN SATURATION: 97 %

## 2025-05-30 DIAGNOSIS — R73.03 PREDIABETES: ICD-10-CM

## 2025-05-30 DIAGNOSIS — E03.9 ACQUIRED HYPOTHYROIDISM: ICD-10-CM

## 2025-05-30 DIAGNOSIS — I10 ESSENTIAL HYPERTENSION: ICD-10-CM

## 2025-05-30 DIAGNOSIS — Z00.00 MEDICARE ANNUAL WELLNESS VISIT, SUBSEQUENT: Primary | ICD-10-CM

## 2025-05-30 DIAGNOSIS — E78.5 DYSLIPIDEMIA: ICD-10-CM

## 2025-05-30 PROCEDURE — G0439 PPPS, SUBSEQ VISIT: HCPCS | Performed by: NURSE PRACTITIONER

## 2025-05-30 PROCEDURE — 1123F ACP DISCUSS/DSCN MKR DOCD: CPT | Performed by: NURSE PRACTITIONER

## 2025-05-30 PROCEDURE — 1159F MED LIST DOCD IN RCRD: CPT | Performed by: NURSE PRACTITIONER

## 2025-05-30 PROCEDURE — 3074F SYST BP LT 130 MM HG: CPT | Performed by: NURSE PRACTITIONER

## 2025-05-30 PROCEDURE — 3078F DIAST BP <80 MM HG: CPT | Performed by: NURSE PRACTITIONER

## 2025-05-30 RX ORDER — ATORVASTATIN CALCIUM 40 MG/1
40 TABLET, FILM COATED ORAL DAILY
Qty: 90 TABLET | Refills: 3 | Status: SHIPPED | OUTPATIENT
Start: 2025-05-30

## 2025-05-30 SDOH — ECONOMIC STABILITY: FOOD INSECURITY: WITHIN THE PAST 12 MONTHS, YOU WORRIED THAT YOUR FOOD WOULD RUN OUT BEFORE YOU GOT MONEY TO BUY MORE.: NEVER TRUE

## 2025-05-30 SDOH — ECONOMIC STABILITY: FOOD INSECURITY: WITHIN THE PAST 12 MONTHS, THE FOOD YOU BOUGHT JUST DIDN'T LAST AND YOU DIDN'T HAVE MONEY TO GET MORE.: NEVER TRUE

## 2025-05-30 ASSESSMENT — PATIENT HEALTH QUESTIONNAIRE - PHQ9
2. FEELING DOWN, DEPRESSED OR HOPELESS: NOT AT ALL
SUM OF ALL RESPONSES TO PHQ QUESTIONS 1-9: 0
1. LITTLE INTEREST OR PLEASURE IN DOING THINGS: NOT AT ALL
SUM OF ALL RESPONSES TO PHQ QUESTIONS 1-9: 0

## 2025-05-30 ASSESSMENT — LIFESTYLE VARIABLES
HOW MANY STANDARD DRINKS CONTAINING ALCOHOL DO YOU HAVE ON A TYPICAL DAY: PATIENT DOES NOT DRINK
HOW OFTEN DO YOU HAVE A DRINK CONTAINING ALCOHOL: NEVER

## 2025-05-30 NOTE — PROGRESS NOTES
Medicare Annual Wellness Visit    Esteban Solorzano is here for Medicare AWV (No issues. )    Assessment & Plan   Medicare annual wellness visit, subsequent  Dyslipidemia  -     Comprehensive Metabolic Panel; Future  -     Lipid, Fasting; Future  -     atorvastatin (LIPITOR) 40 MG tablet; Take 1 tablet by mouth daily, Disp-90 tablet, R-3Normal  Essential hypertension  -     Comprehensive Metabolic Panel; Future  -     CBC with Auto Differential; Future  -     TSH reflex to FT4; Future  -     Lipid, Fasting; Future  -     Albumin/Creatinine Ratio, Urine; Future  Acquired hypothyroidism  -     TSH reflex to FT4; Future  Prediabetes  -     Comprehensive Metabolic Panel; Future  -     Lipid, Fasting; Future  -     Hemoglobin A1C; Future  -     Albumin/Creatinine Ratio, Urine; Future     - chronic conditions stable  - con't current meds without change  - annual labs    Return in 1 year (on 5/30/2026) for Medicare AWV.     Subjective   The following acute and/or chronic problems were also addressed today:  HTN    Does patient check BP regularly at home? - Yes - 130/80's  Current Medication regimen - HCTZ, spironolactone, Lisinopril, Toprol, Procardia  Tolerating medications well? - yes    Shortness of breath or chest pain? No  Headache or visual complaints? No  Neurologic changes like confusion? No  Extremity edema? Yes - stable in RLE  Lightheadedness? No  BP Readings from Last 3 Encounters:   05/30/25 108/62   12/26/24 124/66   10/23/24 (!) 148/88         Patient's complete Health Risk Assessment and screening values have been reviewed and are found in Flowsheets. The following problems were reviewed today and where indicated follow up appointments were made and/or referrals ordered.    Positive Risk Factor Screenings with Interventions:                Abnormal BMI (obese):  Body mass index is 30.61 kg/m². (!) Abnormal  Interventions:  Patient comments: biking, walking      Dentist Screen:  Have you seen the dentist

## 2025-06-04 ENCOUNTER — RESULTS FOLLOW-UP (OUTPATIENT)
Dept: FAMILY MEDICINE CLINIC | Age: 75
End: 2025-06-04

## 2025-06-04 ENCOUNTER — TELEPHONE (OUTPATIENT)
Dept: FAMILY MEDICINE CLINIC | Age: 75
End: 2025-06-04

## 2025-06-04 LAB
A/G RATIO: 1.4 (ref 1.5–2.5)
ALBUMIN: 4.1 G/DL (ref 3.5–5)
ALP BLD-CCNC: 77 IU/L (ref 41–137)
ALT SERPL-CCNC: 13 IU/L (ref 10–40)
ANION GAP SERPL CALCULATED.3IONS-SCNC: 6 MMOL/L (ref 4–12)
AST SERPL-CCNC: 15 IU/L (ref 15–41)
BASOPHILS ABSOLUTE: 100 /CMM (ref 0–200)
BASOPHILS RELATIVE PERCENT: 1.1 % (ref 0–2)
BILIRUB SERPL-MCNC: 1 MG/DL (ref 0.2–1)
BUN BLDV-MCNC: 21 MG/DL (ref 7–20)
CALCIUM SERPL-MCNC: 9.6 MG/DL (ref 8.8–10.5)
CHLORIDE BLD-SCNC: 104 MEQ/L (ref 101–111)
CHOLESTEROL, TOTAL: 129 MG/DL
CHOLESTEROL/HDL RELATIVE RISK: 3.3 (ref 4–5)
CO2: 31 MEQ/L (ref 21–32)
CREAT SERPL-MCNC: 1.25 MG/DL (ref 0.6–1.3)
CREATININE CLEARANCE: 56
CREATININE, RANDOM URINE: 213.4 MG/DL
DIRECT-LDL / HDL RISK: 2
EOSINOPHILS ABSOLUTE: 100 /CMM (ref 0–500)
EOSINOPHILS RELATIVE PERCENT: 1.6 % (ref 0–6)
ESTIMATED AVERAGE GLUCOSE: 131 MG/DL
GLUCOSE: 103 MG/DL (ref 70–110)
HBA1C MFR BLD: 6.2 % (ref 4.4–6.4)
HCT VFR BLD CALC: 43.2 % (ref 40–49)
HDLC SERPL-MCNC: 39 MG/DL
HEMOGLOBIN: 14.4 GM/DL (ref 13.5–16.5)
LDL CHOLESTEROL DIRECT: 80 MG/DL
LYMPHOCYTES ABSOLUTE: 1500 /CMM (ref 1000–4800)
LYMPHOCYTES RELATIVE PERCENT: 25.8 % (ref 15–45)
MCH RBC QN AUTO: 30.7 PG (ref 27.5–33)
MCHC RBC AUTO-ENTMCNC: 33.3 GM/DL (ref 33–36)
MCV RBC AUTO: 92.2 CU MIC (ref 80–97)
MICROALBUMIN/CREAT 24H UR: 14.1 MG/L
MICROALBUMIN/CREAT UR-RTO: 6.6 MG/GM (ref 0–30)
MONOCYTES ABSOLUTE: 600 /CMM (ref 0–800)
MONOCYTES RELATIVE PERCENT: 10 % (ref 2–10)
NEUTROPHILS ABSOLUTE: 3600 /CMM (ref 1800–7700)
NEUTROPHILS RELATIVE PERCENT: 61.5 % (ref 40–70)
NUCLEATED RBCS: 0.2 /100 WBC
PDW BLD-RTO: 13.6 % (ref 12–16)
PLATELET # BLD: 202 TH/CMM (ref 150–400)
POTASSIUM SERPL-SCNC: 3.8 MEQ/L (ref 3.6–5)
RBC # BLD: 4.68 MIL/CMM (ref 4.5–6)
SODIUM BLD-SCNC: 141 MEQ/L (ref 135–145)
TOTAL PROTEIN: 7.1 G/DL (ref 6.2–8)
TRIGL SERPL-MCNC: 111 MG/DL
TSH REFLEX: 1.07 MCIU/ML (ref 0.49–4.67)
VLDLC SERPL CALC-MCNC: 22 MG/DL
WBC # BLD: 5.8 TH/CMM (ref 4.4–10.5)

## 2025-06-04 NOTE — TELEPHONE ENCOUNTER
Left detailed message results. Okay per HIPAA. Requested call back at [phone]  if they have any further questions.

## 2025-06-04 NOTE — TELEPHONE ENCOUNTER
Robert Ricks, APRN - CNP  P [facility] Clinical Staff    Let Wally know overall his labs are stable and in appropriate ranges.

## 2025-08-27 DIAGNOSIS — I49.9 IRREGULAR HEART BEAT: ICD-10-CM

## 2025-08-27 DIAGNOSIS — I10 ESSENTIAL HYPERTENSION: ICD-10-CM

## 2025-08-27 DIAGNOSIS — I49.3 FREQUENT PVCS: ICD-10-CM

## 2025-08-27 RX ORDER — METOPROLOL SUCCINATE 25 MG/1
25 TABLET, EXTENDED RELEASE ORAL DAILY
Qty: 90 TABLET | Refills: 3 | Status: SHIPPED | OUTPATIENT
Start: 2025-08-27

## 2025-08-27 RX ORDER — POTASSIUM CHLORIDE 1500 MG/1
TABLET, EXTENDED RELEASE ORAL DAILY
Qty: 90 TABLET | Refills: 3 | Status: SHIPPED | OUTPATIENT
Start: 2025-08-27